# Patient Record
Sex: FEMALE | Race: ASIAN | Employment: FULL TIME | URBAN - METROPOLITAN AREA
[De-identification: names, ages, dates, MRNs, and addresses within clinical notes are randomized per-mention and may not be internally consistent; named-entity substitution may affect disease eponyms.]

---

## 2023-01-11 ENCOUNTER — OFFICE VISIT (OUTPATIENT)
Dept: OBGYN CLINIC | Age: 33
End: 2023-01-11
Payer: COMMERCIAL

## 2023-01-11 VITALS
HEIGHT: 67 IN | DIASTOLIC BLOOD PRESSURE: 70 MMHG | BODY MASS INDEX: 19.3 KG/M2 | WEIGHT: 123 LBS | SYSTOLIC BLOOD PRESSURE: 110 MMHG

## 2023-01-11 DIAGNOSIS — Z34.01 NORMAL FIRST PREGNANCY CONFIRMED, CURRENTLY IN FIRST TRIMESTER: ICD-10-CM

## 2023-01-11 DIAGNOSIS — N92.6 MISSED MENSES: Primary | ICD-10-CM

## 2023-01-11 LAB
ABO + RH BLD: NORMAL
BASOPHILS # BLD: 0.1 K/UL (ref 0–0.2)
BASOPHILS NFR BLD: 1 % (ref 0–2)
BLOOD GROUP ANTIBODIES SERPL: NORMAL
DIFFERENTIAL METHOD BLD: ABNORMAL
EOSINOPHIL # BLD: 0.1 K/UL (ref 0–0.8)
EOSINOPHIL NFR BLD: 1 % (ref 0.5–7.8)
ERYTHROCYTE [DISTWIDTH] IN BLOOD BY AUTOMATED COUNT: 12.8 % (ref 11.9–14.6)
HCT VFR BLD AUTO: 37.6 % (ref 35.8–46.3)
HGB BLD-MCNC: 12.5 G/DL (ref 11.7–15.4)
IMM GRANULOCYTES # BLD AUTO: 0 K/UL (ref 0–0.5)
IMM GRANULOCYTES NFR BLD AUTO: 0 % (ref 0–5)
LYMPHOCYTES # BLD: 2.5 K/UL (ref 0.5–4.6)
LYMPHOCYTES NFR BLD: 24 % (ref 13–44)
MCH RBC QN AUTO: 29.7 PG (ref 26.1–32.9)
MCHC RBC AUTO-ENTMCNC: 33.2 G/DL (ref 31.4–35)
MCV RBC AUTO: 89.3 FL (ref 82–102)
MONOCYTES # BLD: 0.9 K/UL (ref 0.1–1.3)
MONOCYTES NFR BLD: 8 % (ref 4–12)
NEUTS SEG # BLD: 7.1 K/UL (ref 1.7–8.2)
NEUTS SEG NFR BLD: 66 % (ref 43–78)
NRBC # BLD: 0 K/UL (ref 0–0.2)
PLATELET # BLD AUTO: 287 K/UL (ref 150–450)
PMV BLD AUTO: 9.3 FL (ref 9.4–12.3)
RBC # BLD AUTO: 4.21 M/UL (ref 4.05–5.2)
WBC # BLD AUTO: 10.6 K/UL (ref 4.3–11.1)

## 2023-01-11 PROCEDURE — 99203 OFFICE O/P NEW LOW 30 MIN: CPT | Performed by: OBSTETRICS & GYNECOLOGY

## 2023-01-11 PROCEDURE — 76856 US EXAM PELVIC COMPLETE: CPT | Performed by: OBSTETRICS & GYNECOLOGY

## 2023-01-11 NOTE — PROGRESS NOTES
Patient presents today for   Chief Complaint   Patient presents with    New Patient     : Arti Jefferson #[id number]      Amenorrhea     LMP: Patient's last menstrual period was 10/23/2022 (approximate). Contraception: none        No Known Allergies  Current Outpatient Medications   Medication Sig Dispense Refill    pyridoxine (RA VITAMIN B-6) 50 MG tablet Take 1 tablet by mouth in the morning and at bedtime 60 tablet 3    aspirin EC 81 MG EC tablet Take 1 tablet by mouth daily 90 tablet 2    Calcium Citrate-Vitamin D (CELEBRATE CALCIUM CITRATE) 500-12.5 MG-MCG CHEW Take 1 tablet by mouth daily 90 tablet 3    UNABLE TO FIND SeroVital Advanced      Prenatal Vit-Fe Fumarate-FA (PRENATAL PO) Take by mouth       No current facility-administered medications for this visit. History reviewed. No pertinent past medical history. No past surgical history on file.   Social History     Socioeconomic History    Marital status:      Spouse name: Not on file    Number of children: Not on file    Years of education: Not on file    Highest education level: Not on file   Occupational History    Not on file   Tobacco Use    Smoking status: Never    Smokeless tobacco: Never   Vaping Use    Vaping Use: Never used   Substance and Sexual Activity    Alcohol use: Never    Drug use: Never    Sexual activity: Yes     Partners: Male   Other Topics Concern    Not on file   Social History Narrative    Not on file     Social Determinants of Health     Financial Resource Strain: Not on file   Food Insecurity: Not on file   Transportation Needs: Not on file   Physical Activity: Not on file   Stress: Not on file   Social Connections: Not on file   Intimate Partner Violence: Not on file   Housing Stability: Not on file     Family History   Problem Relation Age of Onset    Breast Cancer Neg Hx     Colon Cancer Neg Hx     Ovarian Cancer Neg Hx      /70   Ht 5' 7\" (1.702 m)   Wt 123 lb (55.8 kg)   LMP 10/23/2022 (Approximate)   BMI 19.26 kg/m²      ROS:  Constitutional: Negative. HENT: Negative. Eyes: Negative. Respiratory: Negative. Cardiovascular: Negative. Gastrointestinal: Negative. Endocrine: Negative. Genitourinary: Negative. Musculoskeletal: Negative. Allergic/Immunologic: Negative. Neurological: Negative. Hematological: Negative. Psychiatric/Behavioral: Negative. Physical Exam  Vitals reviewed. Constitutional:       General: She is not in acute distress. Appearance: Normal appearance. She is well-developed. She is not ill-appearing, toxic-appearing or diaphoretic. HENT:      Head: Normocephalic and atraumatic. Eyes:      General: No scleral icterus. Conjunctiva/sclera: Conjunctivae normal.      Pupils: Pupils are equal, round, and reactive to light. Pulmonary:      Effort: Pulmonary effort is normal. No respiratory distress. Abdominal:      General: There is no distension. Musculoskeletal:         General: Normal range of motion. Cervical back: Normal range of motion and neck supple. Skin:     General: Skin is warm and dry. Coloration: Skin is not jaundiced or pale. Findings: No erythema or rash. Neurological:      General: No focal deficit present. Mental Status: She is alert, oriented to person, place, and time and easily aroused. Motor: No abnormal muscle tone. Coordination: Coordination normal.   Psychiatric:         Mood and Affect: Mood normal.         Speech: Speech normal.         Behavior: Behavior normal. Behavior is cooperative. Thought Content: Thought content normal.         Judgment: Judgment normal.     Imaging: See scanned report. IUP visualized with +FHT, CRL inconsistent with dates by LMP. Will use PONCHO by US today. PONCHO 8/11/2023, 9w5d. 1. Missed menses    2.  Normal first pregnancy confirmed, currently in first trimester      Orders Placed This Encounter   Procedures    Culture, Urine    AMB POC US, PELVIC COMPLETE    CBC with Auto Differential    Rubella antibody, IgG    HIV 1/2 Ag/Ab, 4TH Generation,W Rflx Confirm    Hepatitis C Antibody    Hepatitis B Surface Antigen    RPR    ABO/Rh    Antibody Screen     Orders Placed This Encounter   Medications    pyridoxine (RA VITAMIN B-6) 50 MG tablet     Sig: Take 1 tablet by mouth in the morning and at bedtime     Dispense:  60 tablet     Refill:  3    aspirin EC 81 MG EC tablet     Sig: Take 1 tablet by mouth daily     Dispense:  90 tablet     Refill:  2    Calcium Citrate-Vitamin D (CELEBRATE CALCIUM CITRATE) 500-12.5 MG-MCG CHEW     Sig: Take 1 tablet by mouth daily     Dispense:  90 tablet     Refill:  3     IUP confirmed with ultrasound office today after missed period. ROS reveals common discomforts of pregnancy. Reviewed anticipated PONCHO with patient based on LMP which is confirmed on imaging today. Questions encouraged and answered. Reviewed plan of care for supervision of pregnancy. Patient will return for a new OB education appt and have prenatal labs reviewed at that time. Encouraged taking a daily prenatal vitamin with folic acid and DHA which patient is already doing. Patient instructed to notify us or seek medical attention for any abdominal pain, cramping and vaginal bleeding, fever, or vomiting other than mild pregnancy sickness. IUP confirmed with ultrasound office today after missed period. ROS reveals common discomforts of pregnancy. Reviewed anticipated PONCHO with patient based on LMP which is confirmed on imaging today. Questions encouraged and answered. Reviewed plan of care for supervision of pregnancy. Patient will return for a new OB education appt and have prenatal labs reviewed at that time. Encouraged taking a daily prenatal vitamin with folic acid and DHA which patient is already doing.  Patient instructed to notify us or seek medical attention for any abdominal pain, cramping and vaginal bleeding, fever, or vomiting other than mild pregnancy sickness. Reviewed her supplements and advised pt to take the PNV and DHA, no extra fish oil supp needed due to higher than recommended doses of some of the vitamins if taking both. Greater than 35 minutes spent on reviewing her imaging, ordering labs and in face to face counseling, education, and examining the patient regarding missed period, pregnancy dx and POC for follow up visits. Return in about 2 weeks (around 1/25/2023), or if symptoms worsen or fail to improve, for NewOB education w/ OB coordinator.

## 2023-01-12 LAB
HBV SURFACE AG SER QL: NONREACTIVE
HCV AB SER QL: NONREACTIVE
HIV 1+2 AB+HIV1 P24 AG SERPL QL IA: NONREACTIVE
HIV 1/2 RESULT COMMENT: NORMAL
RPR SER QL: NONREACTIVE
RUBV IGG SERPL IA-ACNC: 105.5 IU/ML (ref 0–50)

## 2023-01-14 LAB
BACTERIA SPEC CULT: NORMAL
SERVICE CMNT-IMP: NORMAL

## 2023-01-16 ENCOUNTER — TELEPHONE (OUTPATIENT)
Dept: OBGYN CLINIC | Age: 33
End: 2023-01-16

## 2023-01-16 RX ORDER — ASPIRIN 81 MG/1
81 TABLET ORAL DAILY
Qty: 90 TABLET | Refills: 2 | Status: SHIPPED | OUTPATIENT
Start: 2023-01-16

## 2023-01-16 RX ORDER — PYRIDOXINE HCL (VITAMIN B6) 50 MG
50 TABLET ORAL 2 TIMES DAILY
Qty: 60 TABLET | Refills: 3 | Status: SHIPPED | OUTPATIENT
Start: 2023-01-16

## 2023-01-16 NOTE — TELEPHONE ENCOUNTER
Pts  called regarding prescriptions discussed at last visit. Pt's  (on VIRGEN) said the pharmacy has not yet received the rx. Informed him that 3 rx's were sent to [id number], [address] [place] [address] today, he voiced understanding.

## 2023-01-23 ENCOUNTER — NURSE ONLY (OUTPATIENT)
Dept: OBGYN CLINIC | Age: 33
End: 2023-01-23

## 2023-01-23 VITALS — HEIGHT: 67 IN | WEIGHT: 124 LBS | BODY MASS INDEX: 19.46 KG/M2

## 2023-01-23 DIAGNOSIS — Z34.01 NORMAL FIRST PREGNANCY CONFIRMED, CURRENTLY IN FIRST TRIMESTER: Primary | ICD-10-CM

## 2023-01-23 RX ORDER — ONDANSETRON 4 MG/1
4 TABLET, ORALLY DISINTEGRATING ORAL 3 TIMES DAILY PRN
Qty: 30 TABLET | Refills: 1 | Status: SHIPPED | OUTPATIENT
Start: 2023-01-23

## 2023-01-23 RX ORDER — LANOLIN ALCOHOL/MO/W.PET/CERES
50 CREAM (GRAM) TOPICAL EVERY 6 HOURS
Qty: 120 TABLET | Refills: 3 | Status: SHIPPED | OUTPATIENT
Start: 2023-01-23 | End: 2023-02-22

## 2023-01-23 NOTE — PROGRESS NOTES
NOB consult with pt and FOB. Labs today: NIPT and Carrier Screen. Offered pt the option of CF, SMA, and Fragile X carrier testing. Pt desires testing. Offered pt the option of genetic screening (1st screen vs Tetra vs NIPT). Pt desires NIPT. Instructed pt on exercise/nutrition in pregnancy. Reviewed [facility] preg book. Advised pt on using [facility] for hospital needs and [facility] L&D for pregnancy related emergencies. Pt states understanding. NOB forms signed, scanned, and given to pt. [facility]  Nir Kim #[id number]    Medical Hx: none    Surgical Hx: none    Last Pap: never per pt. Pt notified pap smear and std screening will be done at [facility]. Pt OB c/o: N/V daily, vomiting is getting worse. Rx for Unisom and Vit B6 sent to pharmacy on file. Instructions reviewed. Pt voiced understanding. Rx for Zofran sent, pt instructed to only use PRN if Unisom and B6 do not help improve N/V. Fam hx any chromosomal or inheritable disorders: none     COVID Vaccine: x2 per pt    Flu Vaccine: declines     OB hx: none, first pregnancy    [facility]: NOBEX on 2/8/23 with . NIPT and Carrier Screen collected and sent to [facility] via [employer].

## 2023-02-08 ENCOUNTER — INITIAL PRENATAL (OUTPATIENT)
Dept: OBGYN CLINIC | Age: 33
End: 2023-02-08

## 2023-02-08 VITALS — SYSTOLIC BLOOD PRESSURE: 102 MMHG | DIASTOLIC BLOOD PRESSURE: 60 MMHG | BODY MASS INDEX: 19.89 KG/M2 | WEIGHT: 127 LBS

## 2023-02-08 DIAGNOSIS — Z11.3 SCREEN FOR STD (SEXUALLY TRANSMITTED DISEASE): ICD-10-CM

## 2023-02-08 DIAGNOSIS — Z11.51 SCREENING FOR HUMAN PAPILLOMAVIRUS (HPV): ICD-10-CM

## 2023-02-08 DIAGNOSIS — Z34.02 NORMAL FIRST PREGNANCY CONFIRMED, CURRENTLY IN SECOND TRIMESTER: Primary | ICD-10-CM

## 2023-02-08 DIAGNOSIS — Z12.4 SCREENING FOR CERVICAL CANCER: ICD-10-CM

## 2023-02-08 DIAGNOSIS — Z34.02 SUPERVISION OF NORMAL FIRST PREGNANCY IN SECOND TRIMESTER: ICD-10-CM

## 2023-02-08 PROCEDURE — 99902 PR PRENATAL VISIT: CPT | Performed by: OBSTETRICS & GYNECOLOGY

## 2023-02-09 NOTE — PROGRESS NOTES
Patient presents today for   Chief Complaint   Patient presents with    Initial Prenatal Visit       LMP: Patient's last menstrual period was 10/23/2022 (approximate). Contraception: none        No Known Allergies  Current Outpatient Medications   Medication Sig Dispense Refill    vitamin B-6 (PYRIDOXINE) 50 MG tablet Take 1 tablet by mouth in the morning and 1 tablet at noon and 1 tablet in the evening and 1 tablet before bedtime. 120 tablet 3    aspirin EC 81 MG EC tablet Take 1 tablet by mouth daily 90 tablet 2    UNABLE TO FIND SeroVital Advanced      Prenatal Vit-Fe Fumarate-FA (PRENATAL PO) Take by mouth      doxyLAMINE succinate ([facility] SLEEP-A[facility], DOXYLAMINE,) 25 MG tablet Take 1 tablet by mouth nightly (Patient not taking: Reported on 2/8/2023) 30 tablet 3    ondansetron (ZOFRAN-ODT) 4 MG disintegrating tablet Take 1 tablet by mouth 3 times daily as needed for Nausea or Vomiting (Patient not taking: Reported on 2/8/2023) 30 tablet 1     No current facility-administered medications for this visit. No past medical history on file. No past surgical history on file.   Social History     Socioeconomic History    Marital status:      Spouse name: Not on file    Number of children: Not on file    Years of education: Not on file    Highest education level: Not on file   Occupational History    Not on file   Tobacco Use    Smoking status: Never    Smokeless tobacco: Never   Vaping Use    Vaping Use: Never used   Substance and Sexual Activity    Alcohol use: Never    Drug use: Never    Sexual activity: Yes     Partners: Male   Other Topics Concern    Not on file   Social History Narrative    Not on file     Social Determinants of Health     Financial Resource Strain: Not on file   Food Insecurity: Not on file   Transportation Needs: Not on file   Physical Activity: Not on file   Stress: Not on file   Social Connections: Not on file   Intimate Partner Violence: Not on file   Housing Stability: Not on file Family History   Problem Relation Age of Onset    Breast Cancer Neg Hx     Colon Cancer Neg Hx     Ovarian Cancer Neg Hx      /60   Wt 127 lb (57.6 kg)   LMP 10/23/2022 (Approximate)   BMI 19.89 kg/m²      ROS:  Constitutional: Negative. HENT: Negative. Eyes: Negative. Respiratory: Negative. Cardiovascular: Negative. Gastrointestinal: Negative. Endocrine: Negative. Genitourinary: Negative. Musculoskeletal: Negative. Allergic/Immunologic: Negative. Neurological: Negative. Hematological: Negative. Psychiatric/Behavioral: Negative. Physical Exam  Vitals signs reviewed. Constitutional:       General: She is not in acute distress. Appearance: Normal appearance. She is well-developed. She is not ill-appearing, toxic-appearing or diaphoretic. HENT:      Head: Normocephalic and atraumatic. Eyes:      General: No scleral icterus. Conjunctiva/sclera: Conjunctivae normal.      Pupils: Pupils are equal, round, and reactive to light. Neck:      Musculoskeletal: Normal range of motion and neck supple. Thyroid: No thyromegaly. Vascular: No JVD. Trachea: No tracheal deviation. Cardiovascular:      Rate and Rhythm: Normal rate and regular rhythm. Heart sounds: Normal heart sounds. No murmur. No friction rub. No gallop. Pulmonary:      Effort: Pulmonary effort is normal. No respiratory distress. Breath sounds: Normal breath sounds. No stridor. No wheezing, rhonchi or rales. Chest:      Chest wall: No tenderness. Breasts:         Right: No inverted nipple, mass, nipple discharge, skin change or tenderness. Left: No inverted nipple, mass, nipple discharge, skin change or tenderness. Abdominal:      General: Bowel sounds are normal. There is no distension. Palpations: Abdomen is soft. There is no mass. Tenderness: There is no abdominal tenderness. There is no guarding or rebound.    Genitourinary: Labia:         Right: No rash, tenderness, lesion or injury.         Left: No rash, tenderness, lesion or injury.       Vagina: Normal. No signs of injury and foreign body. No vaginal discharge, erythema, tenderness or bleeding.      Cervix: No cervical motion tenderness, discharge or friability.      Uterus: Enlarged and not tender.       Adnexa:         Right: No mass, tenderness or fullness.          Left: No mass, tenderness or fullness.        Comments: External genitalia are normal in appearance.    Examination of urethral meatus reveals location normal and size normal.   Examination of urethra shows no abnormalities.   Examination of vaginal vault reveals no abnormalities.   Cervix is long and closed without visible pathology.   Pap smear collected.  Uterine portion of bimanual exam reveals contour normal, shape regular, descensus absent, no nodularity, no tenderness and size 13+ weeks GA.    Adnexa and parametria exam reveals no masses, nontender.    Visual examination of anus and perineum shows no abnormalities.  Musculoskeletal: Normal range of motion.         General: No tenderness.   Lymphadenopathy:      Cervical: No cervical adenopathy.      Upper Body:      Right upper body: No supraclavicular adenopathy.      Left upper body: No supraclavicular adenopathy.      Lower Body: No right inguinal adenopathy. No left inguinal adenopathy.   Skin:     General: Skin is warm and dry.      Coloration: Skin is not pale.      Findings: No erythema or rash.   Neurological:      Mental Status: She is alert and oriented to person, place, and time.      Cranial Nerves: No cranial nerve deficit.      Motor: No abnormal muscle tone.      Coordination: Coordination normal.   Psychiatric:         Behavior: Behavior normal.         Thought Content: Thought content normal.         Judgment: Judgment normal.      +++FHTs     1. Normal first pregnancy confirmed, currently in second trimester    2. Screening for cervical  cancer    3. Screening for human papillomavirus (HPV)    4. Screen for STD (sexually transmitted disease)    5. Supervision of normal first pregnancy in second trimester      Orders Placed This Encounter   Procedures    PAP IG, CT-NG-TV, Aptima HPV and rfx 47/61,54([id number])     No orders of the defined types were placed in this encounter. 34yo G1 at 13w5d for initial prenatal visit:  PNL reviewed. NIPT normal female. Ucx neg. B pos. Antibody neg. CBC WNL. RI. HIV NR. HCV NR. HBsAg NR. RPR NR. Pap smear collected with cultures. Routine OB counseling reviewed. RTC 6 wks for Anatomy [address]. Return if symptoms worsen or fail to improve, for Anatomy US then LG

## 2023-02-14 LAB
C TRACH RRNA CVX QL NAA+PROBE: NEGATIVE
CYTOLOGIST CVX/VAG CYTO: NORMAL
CYTOLOGY CVX/VAG DOC THIN PREP: NORMAL
HPV APTIMA: NEGATIVE
HPV GENOTYPE REFLEX: NORMAL
Lab: NORMAL
N GONORRHOEA RRNA CVX QL NAA+PROBE: NEGATIVE
PATH REPORT.FINAL DX SPEC: NORMAL
STAT OF ADQ CVX/VAG CYTO-IMP: NORMAL
T VAGINALIS RRNA SPEC QL NAA+PROBE: NEGATIVE

## 2023-02-20 ENCOUNTER — TELEPHONE (OUTPATIENT)
Dept: OBGYN CLINIC | Age: 33
End: 2023-02-20

## 2023-02-20 PROBLEM — Z34.02 SUPERVISION OF NORMAL FIRST PREGNANCY IN SECOND TRIMESTER: Status: ACTIVE | Noted: 2023-02-20

## 2023-02-20 NOTE — TELEPHONE ENCOUNTER
Phone call the patient's , per VIRGEN. Carrier screening and NIPT results given.  states they saw it on the portal already.

## 2023-04-26 ENCOUNTER — ROUTINE PRENATAL (OUTPATIENT)
Dept: OBGYN CLINIC | Age: 33
End: 2023-04-26
Payer: COMMERCIAL

## 2023-04-26 VITALS — BODY MASS INDEX: 21.93 KG/M2 | WEIGHT: 140 LBS | DIASTOLIC BLOOD PRESSURE: 70 MMHG | SYSTOLIC BLOOD PRESSURE: 120 MMHG

## 2023-04-26 DIAGNOSIS — Z36.2 ENCOUNTER FOR FOLLOW-UP ULTRASOUND OF FETAL ANATOMY: Primary | ICD-10-CM

## 2023-04-26 DIAGNOSIS — Z34.02 SUPERVISION OF NORMAL FIRST PREGNANCY IN SECOND TRIMESTER: ICD-10-CM

## 2023-04-26 PROCEDURE — 76816 OB US FOLLOW-UP PER FETUS: CPT | Performed by: OBSTETRICS & GYNECOLOGY

## 2023-04-26 PROCEDURE — 99902 PR PRENATAL VISIT: CPT | Performed by: OBSTETRICS & GYNECOLOGY

## 2023-04-26 SDOH — ECONOMIC STABILITY: INCOME INSECURITY: HOW HARD IS IT FOR YOU TO PAY FOR THE VERY BASICS LIKE FOOD, HOUSING, MEDICAL CARE, AND HEATING?: NOT HARD AT ALL

## 2023-04-26 SDOH — ECONOMIC STABILITY: HOUSING INSECURITY
IN THE LAST 12 MONTHS, WAS THERE A TIME WHEN YOU DID NOT HAVE A STEADY PLACE TO SLEEP OR SLEPT IN A SHELTER (INCLUDING NOW)?: NO

## 2023-04-26 SDOH — ECONOMIC STABILITY: TRANSPORTATION INSECURITY
IN THE PAST 12 MONTHS, HAS LACK OF TRANSPORTATION KEPT YOU FROM MEETINGS, WORK, OR FROM GETTING THINGS NEEDED FOR DAILY LIVING?: NO

## 2023-04-26 SDOH — ECONOMIC STABILITY: FOOD INSECURITY: WITHIN THE PAST 12 MONTHS, THE FOOD YOU BOUGHT JUST DIDN'T LAST AND YOU DIDN'T HAVE MONEY TO GET MORE.: NEVER TRUE

## 2023-04-26 SDOH — ECONOMIC STABILITY: FOOD INSECURITY: WITHIN THE PAST 12 MONTHS, YOU WORRIED THAT YOUR FOOD WOULD RUN OUT BEFORE YOU GOT MONEY TO BUY MORE.: NEVER TRUE

## 2023-05-03 NOTE — PROGRESS NOTES
4/26/23:  34yo G1 at 24w5d for KATRIN s/p repeat anatomy US:    +FM, no VB, no LOF. Vitals:    04/26/23 1424   BP: 120/70     Weight Metrics 4/26/2023 3/29/2023 2/8/2023 1/23/2023 1/11/2023   Weight 140 lb 137 lb 127 lb 124 lb 123 lb   BMI (Calculated) 0 kg/m2 0 kg/m2 0 kg/m2 19.5 kg/m2 19.3 kg/m2     US today:  AC 69%ile    Anatomy WNL   Cephalic  Female fetus  CL 3.5         1. Encounter for follow-up ultrasound of fetal anatomy    2. Supervision of normal first pregnancy in second trimester      Orders Placed This Encounter   Procedures    AMB POC US OB RE-EVAL/FOLLOW UP     No orders of the defined types were placed in this encounter. 34yo G1 at 24w5d for KATRIN s/p repeat Anatomy US:  PNL reviewed. NIPT normal female. Ucx neg. B pos. Antibody neg. CBC WNL. RI. HIV NR. HCV NR. HBsAg NR. RPR NR. Pap smear collected with cultures. --> Pap normal, cx neg  Routine OB counseling reviewed. Imaging reviewed & discussed. Anatomy complete & WNL. RTC 4wks for 1hr GTT & KATRIN.

## 2023-05-24 ENCOUNTER — ROUTINE PRENATAL (OUTPATIENT)
Dept: OBGYN CLINIC | Age: 33
End: 2023-05-24

## 2023-05-24 VITALS — DIASTOLIC BLOOD PRESSURE: 60 MMHG | BODY MASS INDEX: 22.71 KG/M2 | SYSTOLIC BLOOD PRESSURE: 120 MMHG | WEIGHT: 145 LBS

## 2023-05-24 DIAGNOSIS — Z34.03 NORMAL FIRST PREGNANCY CONFIRMED, CURRENTLY IN THIRD TRIMESTER: Primary | ICD-10-CM

## 2023-05-24 DIAGNOSIS — Z13.1 SCREENING FOR DIABETES MELLITUS: ICD-10-CM

## 2023-05-24 DIAGNOSIS — Z13.0 SCREENING, ANEMIA, DEFICIENCY, IRON: ICD-10-CM

## 2023-05-24 LAB
BASOPHILS # BLD: 0 K/UL (ref 0–0.2)
BASOPHILS NFR BLD: 0 % (ref 0–2)
DIFFERENTIAL METHOD BLD: ABNORMAL
EOSINOPHIL # BLD: 0.1 K/UL (ref 0–0.8)
EOSINOPHIL NFR BLD: 1 % (ref 0.5–7.8)
ERYTHROCYTE [DISTWIDTH] IN BLOOD BY AUTOMATED COUNT: 13 % (ref 11.9–14.6)
GLUCOSE 1 HOUR: 156 MG/DL
HCT VFR BLD AUTO: 37.4 % (ref 35.8–46.3)
HGB BLD-MCNC: 12.6 G/DL (ref 11.7–15.4)
IMM GRANULOCYTES # BLD AUTO: 0.1 K/UL (ref 0–0.5)
IMM GRANULOCYTES NFR BLD AUTO: 1 % (ref 0–5)
LYMPHOCYTES # BLD: 1.8 K/UL (ref 0.5–4.6)
LYMPHOCYTES NFR BLD: 17 % (ref 13–44)
MCH RBC QN AUTO: 30.7 PG (ref 26.1–32.9)
MCHC RBC AUTO-ENTMCNC: 33.7 G/DL (ref 31.4–35)
MCV RBC AUTO: 91 FL (ref 82–102)
MONOCYTES # BLD: 0.6 K/UL (ref 0.1–1.3)
MONOCYTES NFR BLD: 5 % (ref 4–12)
NEUTS SEG # BLD: 8.4 K/UL (ref 1.7–8.2)
NEUTS SEG NFR BLD: 76 % (ref 43–78)
NRBC # BLD: 0 K/UL (ref 0–0.2)
PLATELET # BLD AUTO: 263 K/UL (ref 150–450)
PMV BLD AUTO: 8.8 FL (ref 9.4–12.3)
RBC # BLD AUTO: 4.11 M/UL (ref 4.05–5.2)
WBC # BLD AUTO: 10.9 K/UL (ref 4.3–11.1)

## 2023-05-24 PROCEDURE — 99902 PR PRENATAL VISIT: CPT | Performed by: OBSTETRICS & GYNECOLOGY

## 2023-06-06 NOTE — PROGRESS NOTES
This is a 29 y.o.   at 30w5d for routine OB visit. [facility]  present via ipad during entire visit today. Her Estimated Due Date is 2023, by Ultrasound    Denies leaking of fluid, vaginal bleeding, or regular contractions. Reports fetal movement. States having occasional \"pains to her abdomen\" however, they resolve and denies feeling any regular contractions. Denies HA, blurred vision or RUQ pain. Taking Prenatal Vitamins: Yes     FH: 30cm  FHTs: 140s      Current Outpatient Medications on File Prior to Visit   Medication Sig Dispense Refill    UNABLE TO FIND SeroVital Advanced      Prenatal Vit-Fe Fumarate-FA (PRENATAL PO) Take by mouth      vitamin B-6 (PYRIDOXINE) 50 MG tablet Take 1 tablet by mouth in the morning and 1 tablet at noon and 1 tablet in the evening and 1 tablet before bedtime. 120 tablet 3     No current facility-administered medications on file prior to visit. No Known Allergies    OB History    Para Term  AB Living   1 0 0 0 0 0   SAB IAB Ectopic Molar Multiple Live Births   0 0 0 0 0 0       # 1 - Date: None, Sex: None, Weight: None, GA: None, Delivery: None, Apgar1: None, Apgar5: None, Living: None, Birth Comments: None        History reviewed. No pertinent past medical history. History reviewed. No pertinent surgical history.     Family History   Problem Relation Age of Onset    Breast Cancer Neg Hx     Colon Cancer Neg Hx     Ovarian Cancer Neg Hx        Social History     Socioeconomic History    Marital status:      Spouse name: Not on file    Number of children: Not on file    Years of education: Not on file    Highest education level: Not on file   Occupational History    Not on file   Tobacco Use    Smoking status: Never    Smokeless tobacco: Never   Vaping Use    Vaping Use: Never used   Substance and Sexual Activity    Alcohol use: Never    Drug use: Never    Sexual activity: Yes     Partners: Male   Other Topics Concern

## 2023-06-07 ENCOUNTER — ROUTINE PRENATAL (OUTPATIENT)
Dept: OBGYN CLINIC | Age: 33
End: 2023-06-07

## 2023-06-07 VITALS — SYSTOLIC BLOOD PRESSURE: 118 MMHG | DIASTOLIC BLOOD PRESSURE: 74 MMHG | WEIGHT: 146 LBS | BODY MASS INDEX: 22.87 KG/M2

## 2023-06-07 DIAGNOSIS — Z34.02 SUPERVISION OF NORMAL FIRST PREGNANCY IN SECOND TRIMESTER: Primary | ICD-10-CM

## 2023-06-07 DIAGNOSIS — R73.09 ABNORMAL GLUCOSE TOLERANCE TEST (GTT): ICD-10-CM

## 2023-06-07 DIAGNOSIS — Z3A.30 30 WEEKS GESTATION OF PREGNANCY: ICD-10-CM

## 2023-06-07 PROCEDURE — 99902 PR PRENATAL VISIT: CPT | Performed by: NURSE PRACTITIONER

## 2023-06-07 NOTE — PATIENT INSTRUCTIONS
PTL/labor precautions, [address], and pregnancy warning signs reviewed. Pt advised to call the office at [phone] or go straight to Labor and Delivery at [facility] with any of the following concerns vaginal bleeding, leaking of fluid, aba regularly Q 5-7 minutes for over an hour or not feeling the baby move.      Kick counts and pre-term labor precautions reviewed

## 2023-06-20 NOTE — PROGRESS NOTES
This is a 29 y.o.   at 32w5d for routine OB visit. [facility]  present via ipad during entire visit today. Her Estimated Due Date is 2023, by Ultrasound    Denies leaking of fluid, vaginal bleeding, or regular contractions. Reports fetal movement. Denies HA, blurred vision or RUQ pain. Taking Prenatal Vitamins: Yes     FH: 31cm  FHTs: 150s      Current Outpatient Medications on File Prior to Visit   Medication Sig Dispense Refill    UNABLE TO FIND SeroVital Advanced      Prenatal Vit-Fe Fumarate-FA (PRENATAL PO) Take by mouth       No current facility-administered medications on file prior to visit. No Known Allergies    OB History    Para Term  AB Living   1 0 0 0 0 0   SAB IAB Ectopic Molar Multiple Live Births   0 0 0 0 0 0       # 1 - Date: None, Sex: None, Weight: None, GA: None, Delivery: None, Apgar1: None, Apgar5: None, Living: None, Birth Comments: None        History reviewed. No pertinent past medical history. History reviewed. No pertinent surgical history.     Family History   Problem Relation Age of Onset    Breast Cancer Neg Hx     Colon Cancer Neg Hx     Ovarian Cancer Neg Hx        Social History     Socioeconomic History    Marital status:      Spouse name: Not on file    Number of children: Not on file    Years of education: Not on file    Highest education level: Not on file   Occupational History    Not on file   Tobacco Use    Smoking status: Never    Smokeless tobacco: Never   Vaping Use    Vaping Use: Never used   Substance and Sexual Activity    Alcohol use: Never    Drug use: Never    Sexual activity: Yes     Partners: Male   Other Topics Concern    Not on file   Social History Narrative    Not on file     Social Determinants of Health     Financial Resource Strain: Not on file   Food Insecurity: Not on file   Transportation Needs: Not on file   Physical Activity: Not on file   Stress: Not on file   Social Connections: Not on file

## 2023-06-21 ENCOUNTER — ROUTINE PRENATAL (OUTPATIENT)
Dept: OBGYN CLINIC | Age: 33
End: 2023-06-21

## 2023-06-21 VITALS — WEIGHT: 148 LBS | BODY MASS INDEX: 23.18 KG/M2 | DIASTOLIC BLOOD PRESSURE: 70 MMHG | SYSTOLIC BLOOD PRESSURE: 102 MMHG

## 2023-06-21 DIAGNOSIS — Z34.03 ENCOUNTER FOR PRENATAL CARE OF PRIMIGRAVIDA IN THIRD TRIMESTER, ANTEPARTUM: Primary | ICD-10-CM

## 2023-06-21 DIAGNOSIS — Z3A.32 32 WEEKS GESTATION OF PREGNANCY: ICD-10-CM

## 2023-06-21 NOTE — PATIENT INSTRUCTIONS
PTL/labor precautions, [address], and pregnancy warning signs reviewed. Pt advised to call the office at [phone] or go straight to Labor and Delivery at [facility] with any of the following concerns vaginal bleeding, leaking of fluid, aba regularly Q 5-7 minutes for over an hour or not feeling the baby move.      Kick counts and pre-term labor precautions reviewed

## 2023-07-10 ENCOUNTER — ROUTINE PRENATAL (OUTPATIENT)
Dept: OBGYN CLINIC | Age: 33
End: 2023-07-10

## 2023-07-10 VITALS — BODY MASS INDEX: 23.81 KG/M2 | SYSTOLIC BLOOD PRESSURE: 120 MMHG | WEIGHT: 152 LBS | DIASTOLIC BLOOD PRESSURE: 74 MMHG

## 2023-07-10 DIAGNOSIS — Z34.02 SUPERVISION OF NORMAL FIRST PREGNANCY IN SECOND TRIMESTER: Primary | ICD-10-CM

## 2023-07-10 PROCEDURE — 99902 PR PRENATAL VISIT: CPT | Performed by: OBSTETRICS & GYNECOLOGY

## 2023-07-10 RX ORDER — SALICYLIC ACID 40 %
ADHESIVE PATCH, MEDICATED TOPICAL
COMMUNITY
Start: 2023-07-09

## 2023-07-17 ENCOUNTER — ROUTINE PRENATAL (OUTPATIENT)
Dept: OBGYN CLINIC | Age: 33
End: 2023-07-17

## 2023-07-17 VITALS — WEIGHT: 151 LBS | SYSTOLIC BLOOD PRESSURE: 118 MMHG | DIASTOLIC BLOOD PRESSURE: 72 MMHG | BODY MASS INDEX: 23.65 KG/M2

## 2023-07-17 DIAGNOSIS — Z34.03 SUPERVISION OF NORMAL FIRST PREGNANCY IN THIRD TRIMESTER: Primary | ICD-10-CM

## 2023-07-17 DIAGNOSIS — Z36.85 ANTENATAL SCREENING FOR STREPTOCOCCUS B: ICD-10-CM

## 2023-07-17 DIAGNOSIS — Z3A.36 36 WEEKS GESTATION OF PREGNANCY: ICD-10-CM

## 2023-07-17 DIAGNOSIS — N76.0 ACUTE VAGINITIS: ICD-10-CM

## 2023-07-17 PROCEDURE — 99902 PR PRENATAL VISIT: CPT | Performed by: NURSE PRACTITIONER

## 2023-07-17 NOTE — PATIENT INSTRUCTIONS
PTL/labor precautions, [place], and pregnancy warning signs reviewed. Pt advised to call the office at [phone] or go straight to Labor and Delivery at [facility] with any of the following concerns vaginal bleeding, leaking of fluid, aba regularly Q 5-7 minutes for over an hour or not feeling the baby move.      Kick counts and  labor precautions reviewed

## 2023-07-21 LAB
BACTERIA SPEC CULT: NORMAL
SERVICE CMNT-IMP: NORMAL

## 2023-07-27 ENCOUNTER — ROUTINE PRENATAL (OUTPATIENT)
Dept: OBGYN CLINIC | Age: 33
End: 2023-07-27

## 2023-07-27 VITALS — BODY MASS INDEX: 24.12 KG/M2 | DIASTOLIC BLOOD PRESSURE: 72 MMHG | SYSTOLIC BLOOD PRESSURE: 122 MMHG | WEIGHT: 154 LBS

## 2023-07-27 DIAGNOSIS — Z34.03 SUPERVISION OF NORMAL FIRST PREGNANCY IN THIRD TRIMESTER: Primary | ICD-10-CM

## 2023-07-27 DIAGNOSIS — R73.09 ABNORMAL GTT (GLUCOSE TOLERANCE TEST): ICD-10-CM

## 2023-07-27 PROCEDURE — 99902 PR PRENATAL VISIT: CPT | Performed by: OBSTETRICS & GYNECOLOGY

## 2023-07-27 NOTE — PROGRESS NOTES
Chief Complaint   Patient presents with    Routine Prenatal Visit      used during visit  Markus Ma #[id number]     59IG G1 at 37w6d for KATRIN:    +FM, no VB, no LOF. Vitals:    07/27/23 1212   BP: 122/72       Weight Metrics 7/27/2023 7/17/2023 7/10/2023 6/21/2023 6/7/2023 5/24/2023 4/26/2023   Weight 154 lb 151 lb 152 lb 148 lb 146 lb 145 lb 140 lb   BMI (Calculated) 0 kg/m2 0 kg/m2 0 kg/m2 0 kg/m2 0 kg/m2 0 kg/m2 0 kg/m2     +++FHTs (160s)  SVE to be done at next appt after growth US. 1. Supervision of normal first pregnancy in third trimester    2. Abnormal GTT (glucose tolerance test)          No orders of the defined types were placed in this encounter. No orders of the defined types were placed in this encounter. PNL reviewed. NIPT normal female. Ucx neg. B pos. Antibody neg. CBC WNL. RI. HIV NR. HCV NR. HBsAg NR. RPR NR. Pap smear collected with cultures. --> Pap normal, cx neg  Routine OB counseling reviewed. Anatomy complete & WNL at 24wk appt. Failed 1hr, passed 3hr GTT. --> Pt requesting growth US and given that she did have to take the 3hr GTT, will check next week in order for pt to be more informed when choosing spontaneous labor vs IOL vs section. Reassured pt that her baby is cephalic which is ok to deliver vaginally - pt states she prefers vaginal delivery. Will have SVE next appt as well. Kick counts discussed. GBS negative.    RTC 1wks for KATRIN w/ Growth US

## 2023-08-04 ENCOUNTER — ROUTINE PRENATAL (OUTPATIENT)
Dept: OBGYN CLINIC | Age: 33
End: 2023-08-04
Payer: COMMERCIAL

## 2023-08-04 VITALS — SYSTOLIC BLOOD PRESSURE: 120 MMHG | DIASTOLIC BLOOD PRESSURE: 78 MMHG | WEIGHT: 153 LBS | BODY MASS INDEX: 23.96 KG/M2

## 2023-08-04 DIAGNOSIS — O36.63X0 EXCESSIVE FETAL GROWTH AFFECTING MANAGEMENT OF PREGNANCY IN THIRD TRIMESTER, SINGLE OR UNSPECIFIED FETUS: Primary | ICD-10-CM

## 2023-08-04 PROCEDURE — 99902 PR PRENATAL VISIT: CPT | Performed by: OBSTETRICS & GYNECOLOGY

## 2023-08-04 PROCEDURE — 76816 OB US FOLLOW-UP PER FETUS: CPT | Performed by: OBSTETRICS & GYNECOLOGY

## 2023-08-04 NOTE — PROGRESS NOTES
Chief Complaint   Patient presents with    Routine Prenatal Visit    Ultrasound     Growth US     31yo G1 at 39w0d for KATRIN:    +FM, no VB, no LOF. Vitals:    08/04/23 0924   BP: 120/78       Weight Metrics 8/4/2023 7/27/2023 7/17/2023 7/10/2023 6/21/2023 6/7/2023 5/24/2023   Weight 153 lb 154 lb 151 lb 152 lb 148 lb 146 lb 145 lb   BMI (Calculated) 0 kg/m2 0 kg/m2 0 kg/m2 0 kg/m2 0 kg/m2 0 kg/m2 0 kg/m2     Growth US:  EFW 7lbs 6oz, 48%ile  AC 40%ile  VALENTIN 10  BPP 8/8  Anterior placenta  Cephalic     SVE 0/79/-4    1. Excessive fetal growth affecting management of pregnancy in third trimester, single or unspecified fetus          Orders Placed This Encounter   Procedures    AMB POC US OB RE-EVAL/FOLLOW UP     No orders of the defined types were placed in this encounter. PNL reviewed. NIPT normal female. Ucx neg. B pos. Antibody neg. CBC WNL. RI. HIV NR. HCV NR. HBsAg NR. RPR NR. Pap smear collected with cultures. --> Pap normal, cx neg  Routine OB counseling reviewed. Anatomy complete & WNL at 24wk appt. Failed 1hr, passed 3hr GTT. --> Pt requesting growth US and given that she did have to take the 3hr GTT, will check next week in order for pt to be more informed when choosing spontaneous labor vs IOL vs section. Reassured pt that her baby is cephalic which is ok to deliver vaginally - pt states she prefers vaginal delivery. Kick counts discussed. GBS negative.    RTC 1wks for KATRIN

## 2023-08-05 ENCOUNTER — ANESTHESIA (OUTPATIENT)
Dept: LABOR AND DELIVERY | Age: 33
End: 2023-08-05
Payer: COMMERCIAL

## 2023-08-05 ENCOUNTER — HOSPITAL ENCOUNTER (INPATIENT)
Age: 33
LOS: 2 days | Discharge: HOME | End: 2023-08-07
Attending: OBSTETRICS & GYNECOLOGY | Admitting: OBSTETRICS & GYNECOLOGY
Payer: COMMERCIAL

## 2023-08-05 ENCOUNTER — ANESTHESIA EVENT (OUTPATIENT)
Dept: LABOR AND DELIVERY | Age: 33
End: 2023-08-05
Payer: COMMERCIAL

## 2023-08-05 PROBLEM — Z37.9 NORMAL LABOR: Status: ACTIVE | Noted: 2023-08-05

## 2023-08-05 LAB
ABO + RH BLD: NORMAL
BLOOD GROUP ANTIBODIES SERPL: NORMAL
ERYTHROCYTE [DISTWIDTH] IN BLOOD BY AUTOMATED COUNT: 13.5 % (ref 11.9–14.6)
HCT VFR BLD AUTO: 42.3 % (ref 35.8–46.3)
HGB BLD-MCNC: 14.3 G/DL (ref 11.7–15.4)
MCH RBC QN AUTO: 29.9 PG (ref 26.1–32.9)
MCHC RBC AUTO-ENTMCNC: 33.8 G/DL (ref 31.4–35)
MCV RBC AUTO: 88.3 FL (ref 82–102)
NRBC # BLD: 0 K/UL (ref 0–0.2)
PLATELET # BLD AUTO: 223 K/UL (ref 150–450)
PMV BLD AUTO: 8.7 FL (ref 9.4–12.3)
RBC # BLD AUTO: 4.79 M/UL (ref 4.05–5.2)
SPECIMEN EXP DATE BLD: NORMAL
WBC # BLD AUTO: 16.3 K/UL (ref 4.3–11.1)

## 2023-08-05 PROCEDURE — 86901 BLOOD TYPING SEROLOGIC RH(D): CPT

## 2023-08-05 PROCEDURE — 7100000011 HC PHASE II RECOVERY - ADDTL 15 MIN

## 2023-08-05 PROCEDURE — 7210000100 HC LABOR FEE PER 1 HR

## 2023-08-05 PROCEDURE — 7220000101 HC DELIVERY VAGINAL/SINGLE

## 2023-08-05 PROCEDURE — 36415 COLL VENOUS BLD VENIPUNCTURE: CPT

## 2023-08-05 PROCEDURE — 99284 EMERGENCY DEPT VISIT MOD MDM: CPT

## 2023-08-05 PROCEDURE — 3700000025 EPIDURAL BLOCK: Performed by: ANESTHESIOLOGY

## 2023-08-05 PROCEDURE — 51701 INSERT BLADDER CATHETER: CPT

## 2023-08-05 PROCEDURE — 86900 BLOOD TYPING SEROLOGIC ABO: CPT

## 2023-08-05 PROCEDURE — 0KQM0ZZ REPAIR PERINEUM MUSCLE, OPEN APPROACH: ICD-10-PCS | Performed by: OBSTETRICS & GYNECOLOGY

## 2023-08-05 PROCEDURE — 6360000002 HC RX W HCPCS: Performed by: NURSE ANESTHETIST, CERTIFIED REGISTERED

## 2023-08-05 PROCEDURE — 00HU33Z INSERTION OF INFUSION DEVICE INTO SPINAL CANAL, PERCUTANEOUS APPROACH: ICD-10-PCS | Performed by: ANESTHESIOLOGY

## 2023-08-05 PROCEDURE — 1100000000 HC RM PRIVATE

## 2023-08-05 PROCEDURE — 2580000003 HC RX 258: Performed by: OBSTETRICS & GYNECOLOGY

## 2023-08-05 PROCEDURE — 6360000002 HC RX W HCPCS: Performed by: OBSTETRICS & GYNECOLOGY

## 2023-08-05 PROCEDURE — 86850 RBC ANTIBODY SCREEN: CPT

## 2023-08-05 PROCEDURE — 85027 COMPLETE CBC AUTOMATED: CPT

## 2023-08-05 PROCEDURE — 7100000010 HC PHASE II RECOVERY - FIRST 15 MIN

## 2023-08-05 RX ORDER — MISOPROSTOL 200 UG/1
200 TABLET ORAL PRN
Status: DISCONTINUED | OUTPATIENT
Start: 2023-08-05 | End: 2023-08-05

## 2023-08-05 RX ORDER — DOCUSATE SODIUM 100 MG/1
100 CAPSULE, LIQUID FILLED ORAL 2 TIMES DAILY
Status: DISCONTINUED | OUTPATIENT
Start: 2023-08-05 | End: 2023-08-07 | Stop reason: HOSPADM

## 2023-08-05 RX ORDER — METHYLERGONOVINE MALEATE 0.2 MG/ML
200 INJECTION INTRAVENOUS PRN
Status: DISCONTINUED | OUTPATIENT
Start: 2023-08-05 | End: 2023-08-05

## 2023-08-05 RX ORDER — POLYETHYLENE GLYCOL 3350 17 G/17G
17 POWDER, FOR SOLUTION ORAL DAILY
Status: DISCONTINUED | OUTPATIENT
Start: 2023-08-05 | End: 2023-08-05

## 2023-08-05 RX ORDER — SODIUM CHLORIDE 9 MG/ML
INJECTION, SOLUTION INTRAVENOUS PRN
Status: DISCONTINUED | OUTPATIENT
Start: 2023-08-05 | End: 2023-08-05

## 2023-08-05 RX ORDER — SODIUM CHLORIDE 0.9 % (FLUSH) 0.9 %
5-40 SYRINGE (ML) INJECTION EVERY 12 HOURS SCHEDULED
Status: DISCONTINUED | OUTPATIENT
Start: 2023-08-05 | End: 2023-08-05

## 2023-08-05 RX ORDER — SODIUM CHLORIDE, SODIUM LACTATE, POTASSIUM CHLORIDE, AND CALCIUM CHLORIDE .6; .31; .03; .02 G/100ML; G/100ML; G/100ML; G/100ML
500 INJECTION, SOLUTION INTRAVENOUS PRN
Status: DISCONTINUED | OUTPATIENT
Start: 2023-08-05 | End: 2023-08-05

## 2023-08-05 RX ORDER — ROPIVACAINE HYDROCHLORIDE 2 MG/ML
INJECTION, SOLUTION EPIDURAL; INFILTRATION; PERINEURAL PRN
Status: DISCONTINUED | OUTPATIENT
Start: 2023-08-05 | End: 2023-08-05 | Stop reason: SDUPTHER

## 2023-08-05 RX ORDER — ACETAMINOPHEN 325 MG/1
650 TABLET ORAL EVERY 6 HOURS PRN
Status: DISCONTINUED | OUTPATIENT
Start: 2023-08-05 | End: 2023-08-05 | Stop reason: SDUPTHER

## 2023-08-05 RX ORDER — METHYLERGONOVINE MALEATE 0.2 MG/ML
200 INJECTION INTRAVENOUS PRN
Status: DISCONTINUED | OUTPATIENT
Start: 2023-08-05 | End: 2023-08-07 | Stop reason: HOSPADM

## 2023-08-05 RX ORDER — ACETAMINOPHEN 500 MG
1000 TABLET ORAL EVERY 8 HOURS PRN
Status: DISCONTINUED | OUTPATIENT
Start: 2023-08-05 | End: 2023-08-07 | Stop reason: HOSPADM

## 2023-08-05 RX ORDER — TERBUTALINE SULFATE 1 MG/ML
0.25 INJECTION, SOLUTION SUBCUTANEOUS ONCE
Status: DISCONTINUED | OUTPATIENT
Start: 2023-08-05 | End: 2023-08-05

## 2023-08-05 RX ORDER — SODIUM CHLORIDE 0.9 % (FLUSH) 0.9 %
5-40 SYRINGE (ML) INJECTION PRN
Status: DISCONTINUED | OUTPATIENT
Start: 2023-08-05 | End: 2023-08-07 | Stop reason: HOSPADM

## 2023-08-05 RX ORDER — IBUPROFEN 600 MG/1
600 TABLET ORAL EVERY 6 HOURS
Status: DISCONTINUED | OUTPATIENT
Start: 2023-08-06 | End: 2023-08-07 | Stop reason: HOSPADM

## 2023-08-05 RX ORDER — SODIUM CHLORIDE 0.9 % (FLUSH) 0.9 %
5-40 SYRINGE (ML) INJECTION PRN
Status: DISCONTINUED | OUTPATIENT
Start: 2023-08-05 | End: 2023-08-05

## 2023-08-05 RX ORDER — OXYCODONE HYDROCHLORIDE 5 MG/1
5 TABLET ORAL EVERY 4 HOURS PRN
Status: DISCONTINUED | OUTPATIENT
Start: 2023-08-05 | End: 2023-08-07 | Stop reason: HOSPADM

## 2023-08-05 RX ORDER — DIPHENHYDRAMINE HCL 25 MG
25 CAPSULE ORAL EVERY 4 HOURS PRN
Status: DISCONTINUED | OUTPATIENT
Start: 2023-08-05 | End: 2023-08-05

## 2023-08-05 RX ORDER — MISOPROSTOL 200 UG/1
200 TABLET ORAL EVERY 6 HOURS PRN
Status: DISCONTINUED | OUTPATIENT
Start: 2023-08-05 | End: 2023-08-07 | Stop reason: HOSPADM

## 2023-08-05 RX ORDER — IBUPROFEN 800 MG/1
800 TABLET ORAL EVERY 8 HOURS SCHEDULED
Status: DISCONTINUED | OUTPATIENT
Start: 2023-08-05 | End: 2023-08-05

## 2023-08-05 RX ORDER — ROPIVACAINE HYDROCHLORIDE 2 MG/ML
INJECTION, SOLUTION EPIDURAL; INFILTRATION; PERINEURAL CONTINUOUS PRN
Status: DISCONTINUED | OUTPATIENT
Start: 2023-08-05 | End: 2023-08-05 | Stop reason: SDUPTHER

## 2023-08-05 RX ORDER — SODIUM CHLORIDE, SODIUM LACTATE, POTASSIUM CHLORIDE, CALCIUM CHLORIDE 600; 310; 30; 20 MG/100ML; MG/100ML; MG/100ML; MG/100ML
INJECTION, SOLUTION INTRAVENOUS CONTINUOUS
Status: DISCONTINUED | OUTPATIENT
Start: 2023-08-05 | End: 2023-08-05

## 2023-08-05 RX ORDER — DEXTROSE, SODIUM CHLORIDE, SODIUM LACTATE, POTASSIUM CHLORIDE, AND CALCIUM CHLORIDE 5; .6; .31; .03; .02 G/100ML; G/100ML; G/100ML; G/100ML; G/100ML
INJECTION, SOLUTION INTRAVENOUS CONTINUOUS
Status: DISCONTINUED | OUTPATIENT
Start: 2023-08-05 | End: 2023-08-05

## 2023-08-05 RX ORDER — LANOLIN
CREAM (ML) TOPICAL PRN
Status: DISCONTINUED | OUTPATIENT
Start: 2023-08-05 | End: 2023-08-07 | Stop reason: HOSPADM

## 2023-08-05 RX ORDER — ONDANSETRON 2 MG/ML
4 INJECTION INTRAMUSCULAR; INTRAVENOUS EVERY 6 HOURS PRN
Status: DISCONTINUED | OUTPATIENT
Start: 2023-08-05 | End: 2023-08-05

## 2023-08-05 RX ORDER — OXYCODONE HYDROCHLORIDE 5 MG/1
5 TABLET ORAL EVERY 6 HOURS PRN
Status: DISCONTINUED | OUTPATIENT
Start: 2023-08-05 | End: 2023-08-05

## 2023-08-05 RX ORDER — SODIUM CHLORIDE, SODIUM LACTATE, POTASSIUM CHLORIDE, AND CALCIUM CHLORIDE .6; .31; .03; .02 G/100ML; G/100ML; G/100ML; G/100ML
1000 INJECTION, SOLUTION INTRAVENOUS PRN
Status: DISCONTINUED | OUTPATIENT
Start: 2023-08-05 | End: 2023-08-05

## 2023-08-05 RX ADMIN — OXYTOCIN 4 MILLI-UNITS/MIN: 10 INJECTION INTRAVENOUS at 19:00

## 2023-08-05 RX ADMIN — SODIUM CHLORIDE, POTASSIUM CHLORIDE, SODIUM LACTATE AND CALCIUM CHLORIDE 500 ML: 600; 310; 30; 20 INJECTION, SOLUTION INTRAVENOUS at 16:59

## 2023-08-05 RX ADMIN — ROPIVACAINE HYDROCHLORIDE 10 ML: 2 INJECTION, SOLUTION EPIDURAL; INFILTRATION; PERINEURAL at 17:23

## 2023-08-05 RX ADMIN — ROPIVACAINE HYDROCHLORIDE 10 ML/HR: 2 INJECTION, SOLUTION EPIDURAL; INFILTRATION; PERINEURAL at 17:24

## 2023-08-05 NOTE — ANESTHESIA PROCEDURE NOTES
Epidural Block    Patient location during procedure: OB  Start time: 8/5/2023 5:10 PM  End time: 8/5/2023 5:15 PM  Reason for block: labor epidural  Staffing  Performed: anesthesiologist   Anesthesiologist: Casper Amezquita MD  Epidural  Patient position: sitting  Prep: ChloraPrep and site prepped and draped  Patient monitoring: cardiac monitor, continuous pulse ox and frequent blood pressure checks  Approach: midline  Location: L3-4  Injection technique: CAROLINA saline  Provider prep: mask and sterile gloves  Needle  Needle type: Tuohy   Needle gauge: 17 G  Needle length: 3.5 in  Needle insertion depth: 4 cm  Catheter type: none  Catheter size: 18 G  Catheter at skin depth: 9.5 cm  Test dose: negativeCatheter Secured: tape and tegaderm  Assessment  Sensory level: T8  Hemodynamics: stable  Attempts: 1  Outcomes: uncomplicated and patient tolerated procedure well  Preanesthetic Checklist  Completed: patient identified, IV checked, risks and benefits discussed, surgical/procedural consents, equipment checked, pre-op evaluation, timeout performed, anesthesia consent given, oxygen available, monitors applied/VS acknowledged and blood product R/B/A discussed and consented

## 2023-08-05 NOTE — H&P
History & Physical    Name: Adama Bella MRN: [id number]  SSN: [id number]    YOB: 1990  Age: 29 y.o. Sex: female      Subjective:     Reason for Triage visit:  39w1d and water broke    History of Present Illness: Ms. Vivian Cope is a 29 y.o.  female  with an estimated gestational age of 37w4d with Estimated Date of Delivery: 23. Patient states that her water broke at 3:15 PM today, clear and moderate amount. She has started having contractions, states every 10 minutes. Good FM. No vaginal bleeding. Prenatal care per [facility] and essentially uncomplicated except: Failed 1 hr GTT ---> passed 3 hr GTT (u/s completed yesterday  for suspected fetal macrosomia with EFW 48%ile, 7 lbs 6 oz)       Patient denies fever, headache , vaginal bleeding , and visual disturbances. OB History    Para Term  AB Living   1             SAB IAB Ectopic Molar Multiple Live Births                    # Outcome Date GA Lbr Maninder/2nd Weight Sex Delivery Anes PTL Lv   1 Current              History reviewed. No pertinent past medical history. History reviewed. No pertinent surgical history. Social History     Occupational History    Not on file   Tobacco Use    Smoking status: Never    Smokeless tobacco: Never   Vaping Use    Vaping Use: Never used   Substance and Sexual Activity    Alcohol use: Never    Drug use: Never    Sexual activity: Yes     Partners: Male      Family History   Problem Relation Age of Onset    Breast Cancer Neg Hx     Colon Cancer Neg Hx     Ovarian Cancer Neg Hx        No Known Allergies  Prior to Admission medications    Medication Sig Start Date End Date Taking? Authorizing Provider   Prenatal Vit-Fe Fumarate-FA (PRENATAL PO) Take by mouth    Historical Provider, MD        Review of Systems:  Complete review of systems performed. Those not specifically mentioned in the HPI are either negative are non related to this patient encounter.     Objective:     Vitals:    Vitals:    23

## 2023-08-05 NOTE — L&D DELIVERY NOTE
Ary Hancock Girl Nichelle [[id number]]      Labor Events     Labor: No   Steroids: None  Cervical Ripening Date/Time:      Antibiotics Received during Labor: No  Rupture Identifier: Sac 1  Rupture Date/Time:  23 15:15:00   Rupture Type: SROM  Fluid Color: Clear  Fluid Odor: None  Fluid Volume:  Moderate  Induction: None  Augmentation: Oxytocin  Labor Complications: None              Anesthesia    Method: Epidural       Labor Event Times      Labor onset date/time:  23 15:15:00 EDT     Dilation complete date/time:  23 17:43:00     Start pushing date/time:  2023 18:14:00   Decision date/time (emergent ):            Delivery Details      Delivery Date: 23 Delivery Time: 19:12:00   Delivery Type: Vaginal, Spontaneous              [place] Presentation    Presentation: Vertex  Position: Left  _: Occiput  _: Anterior       Shoulder Dystocia    Shoulder Dystocia Present?: No       Assisted Delivery Details    Forceps Attempted?: No  Vacuum Extractor Attempted?: No                           Cord    Vessels: 3 Vessels  Complications: None  Delayed Cord Clamping?: Yes  Cord Blood Disposition: Lab  Gases Sent?: No              Placenta    Date/Time: 2023 19:14:00  Removal: Spontaneous  Appearance: Intact  Disposition: Discarded       Lacerations    Episiotomy: None  Perineal Lacerations: 2nd  Other Lacerations: no non-perineal laceration  Number of Repair Packets: 2       Vaginal Counts    Initial Count Personnel: Marty Malcolm CST  Initial Count Verified By: [facility] RN  Intial Sponge Count: Correct Intial Needles Count: Correct Intial Instruments Count: Correct   Final Sponges Count: Correct Final Needles  Count: Correct Final Instruments Count: Correct   Final Count Personnel: Mary Quinones  Final Count Verified ByNasreen Decker RN  Accurate Final Count?: Yes       Blood Loss  Mother: Yumiko Lambert #[id number]     Start of Mother's Information      Delivery Blood Loss  23 [id number] -

## 2023-08-05 NOTE — L&D DELIVERY NOTE
[facility], [address] [facility], [address] [place] [address]  Corinne Morocco, MD, NISREEN Bravo-BC  Sri Jha MD, FACOG  Delivery Note    Present for entire delivery. Details in delivery summary. . Viable female infant, APGARS 9,9 .   Weight 8 lbs., 2 oz. EBL:  250 mL  Pain mgmt:   epidural    Placenta spont, intact, 3VC. 2nd degree midline perineal laceration repaired with 3-0 vicryl rapide in usual fashion    Pt and infant stable.       Caroline Watson MD   7:28 PM  23

## 2023-08-05 NOTE — CONSULTS
Session ID: [id number]  Language: Mandarin   ID: #[id number]   Name: [facility] AT [facility]

## 2023-08-06 LAB
ALBUMIN SERPL-MCNC: 2.3 G/DL (ref 3.5–5)
ALBUMIN/GLOB SERPL: 0.6 (ref 0.4–1.6)
ALP SERPL-CCNC: 170 U/L (ref 50–130)
ALT SERPL-CCNC: 15 U/L (ref 12–65)
ANION GAP SERPL CALC-SCNC: 8 MMOL/L (ref 2–11)
AST SERPL-CCNC: 24 U/L (ref 15–37)
BASOPHILS # BLD: 0 K/UL (ref 0–0.2)
BASOPHILS NFR BLD: 0 % (ref 0–2)
BILIRUB SERPL-MCNC: 0.4 MG/DL (ref 0.2–1.1)
BUN SERPL-MCNC: 7 MG/DL (ref 6–23)
CALCIUM SERPL-MCNC: 8.9 MG/DL (ref 8.3–10.4)
CHLORIDE SERPL-SCNC: 109 MMOL/L (ref 101–110)
CO2 SERPL-SCNC: 24 MMOL/L (ref 21–32)
CREAT SERPL-MCNC: 0.49 MG/DL (ref 0.6–1)
DIFFERENTIAL METHOD BLD: ABNORMAL
EOSINOPHIL # BLD: 0.1 K/UL (ref 0–0.8)
EOSINOPHIL NFR BLD: 0 % (ref 0.5–7.8)
ERYTHROCYTE [DISTWIDTH] IN BLOOD BY AUTOMATED COUNT: 13.5 % (ref 11.9–14.6)
GLOBULIN SER CALC-MCNC: 3.7 G/DL (ref 2.8–4.5)
GLUCOSE SERPL-MCNC: 104 MG/DL (ref 65–100)
HCT VFR BLD AUTO: 38.1 % (ref 35.8–46.3)
HGB BLD-MCNC: 13 G/DL (ref 11.7–15.4)
IMM GRANULOCYTES # BLD AUTO: 0.1 K/UL (ref 0–0.5)
IMM GRANULOCYTES NFR BLD AUTO: 1 % (ref 0–5)
LYMPHOCYTES # BLD: 2 K/UL (ref 0.5–4.6)
LYMPHOCYTES NFR BLD: 12 % (ref 13–44)
MCH RBC QN AUTO: 30.2 PG (ref 26.1–32.9)
MCHC RBC AUTO-ENTMCNC: 34.1 G/DL (ref 31.4–35)
MCV RBC AUTO: 88.6 FL (ref 82–102)
MONOCYTES # BLD: 1.4 K/UL (ref 0.1–1.3)
MONOCYTES NFR BLD: 8 % (ref 4–12)
NEUTS SEG # BLD: 13.4 K/UL (ref 1.7–8.2)
NEUTS SEG NFR BLD: 79 % (ref 43–78)
NRBC # BLD: 0 K/UL (ref 0–0.2)
PLATELET # BLD AUTO: 195 K/UL (ref 150–450)
PMV BLD AUTO: 8.7 FL (ref 9.4–12.3)
POTASSIUM SERPL-SCNC: 3.4 MMOL/L (ref 3.5–5.1)
PROT SERPL-MCNC: 6 G/DL (ref 6.3–8.2)
RBC # BLD AUTO: 4.3 M/UL (ref 4.05–5.2)
SODIUM SERPL-SCNC: 141 MMOL/L (ref 133–143)
URATE SERPL-MCNC: 4.7 MG/DL (ref 2.6–6)
WBC # BLD AUTO: 17 K/UL (ref 4.3–11.1)

## 2023-08-06 PROCEDURE — 84550 ASSAY OF BLOOD/URIC ACID: CPT

## 2023-08-06 PROCEDURE — 36415 COLL VENOUS BLD VENIPUNCTURE: CPT

## 2023-08-06 PROCEDURE — 80053 COMPREHEN METABOLIC PANEL: CPT

## 2023-08-06 PROCEDURE — 1100000000 HC RM PRIVATE

## 2023-08-06 PROCEDURE — 6370000000 HC RX 637 (ALT 250 FOR IP): Performed by: OBSTETRICS & GYNECOLOGY

## 2023-08-06 PROCEDURE — 85025 COMPLETE CBC W/AUTO DIFF WBC: CPT

## 2023-08-06 RX ORDER — FUROSEMIDE 20 MG/1
20 TABLET ORAL DAILY
Status: DISCONTINUED | OUTPATIENT
Start: 2023-08-06 | End: 2023-08-07 | Stop reason: HOSPADM

## 2023-08-06 RX ADMIN — IBUPROFEN 600 MG: 600 TABLET, FILM COATED ORAL at 04:36

## 2023-08-06 RX ADMIN — IBUPROFEN 600 MG: 600 TABLET, FILM COATED ORAL at 10:44

## 2023-08-06 RX ADMIN — FUROSEMIDE 20 MG: 20 TABLET ORAL at 10:47

## 2023-08-06 RX ADMIN — Medication: at 00:18

## 2023-08-06 RX ADMIN — DOCUSATE SODIUM 100 MG: 100 CAPSULE, LIQUID FILLED ORAL at 10:44

## 2023-08-06 RX ADMIN — WITCH HAZEL: 500 SOLUTION RECTAL; TOPICAL at 00:18

## 2023-08-06 RX ADMIN — IBUPROFEN 600 MG: 600 TABLET, FILM COATED ORAL at 18:40

## 2023-08-06 RX ADMIN — WITCH HAZEL: 500 SOLUTION RECTAL; TOPICAL at 17:24

## 2023-08-06 RX ADMIN — DOCUSATE SODIUM 100 MG: 100 CAPSULE, LIQUID FILLED ORAL at 18:41

## 2023-08-06 NOTE — LACTATION NOTE

## 2023-08-06 NOTE — LACTATION NOTE
This note was copied from a baby's chart. Lactation visit. Baby asleep. Mom doing breast and bottle feeding formula. Mom states baby does latch well. RN observed and said baby latched well earlier today. Discussed feeding needs. Feed every 3 hours. Offer the breast first at all feeds to give baby colostrum and stimulate milk production. No medical need to supplement if baby latching well. Always supplement post nursing. Mom states understanding. Offered help with breastfeeding if needed.

## 2023-08-07 VITALS
RESPIRATION RATE: 18 BRPM | HEART RATE: 69 BPM | OXYGEN SATURATION: 98 % | SYSTOLIC BLOOD PRESSURE: 131 MMHG | DIASTOLIC BLOOD PRESSURE: 87 MMHG | TEMPERATURE: 97.9 F

## 2023-08-07 PROCEDURE — 6370000000 HC RX 637 (ALT 250 FOR IP): Performed by: OBSTETRICS & GYNECOLOGY

## 2023-08-07 RX ORDER — FUROSEMIDE 20 MG/1
20 TABLET ORAL DAILY
Qty: 3 TABLET | Refills: 0 | Status: SHIPPED | OUTPATIENT
Start: 2023-08-08 | End: 2023-08-11

## 2023-08-07 RX ORDER — LABETALOL 100 MG/1
100 TABLET, FILM COATED ORAL 2 TIMES DAILY
Status: DISCONTINUED | OUTPATIENT
Start: 2023-08-07 | End: 2023-08-07

## 2023-08-07 RX ORDER — IBUPROFEN 800 MG/1
800 TABLET ORAL EVERY 8 HOURS PRN
Qty: 100 TABLET | Refills: 0 | Status: SHIPPED | OUTPATIENT
Start: 2023-08-07

## 2023-08-07 RX ORDER — OXYCODONE HYDROCHLORIDE 5 MG/1
5 TABLET ORAL EVERY 8 HOURS PRN
Qty: 6 TABLET | Refills: 0 | Status: SHIPPED | OUTPATIENT
Start: 2023-08-07 | End: 2023-08-10

## 2023-08-07 RX ADMIN — IBUPROFEN 600 MG: 600 TABLET, FILM COATED ORAL at 05:25

## 2023-08-07 RX ADMIN — ACETAMINOPHEN 1000 MG: 500 TABLET, FILM COATED ORAL at 08:44

## 2023-08-07 RX ADMIN — DOCUSATE SODIUM 100 MG: 100 CAPSULE, LIQUID FILLED ORAL at 08:44

## 2023-08-07 RX ADMIN — WITCH HAZEL: 500 SOLUTION RECTAL; TOPICAL at 02:23

## 2023-08-07 RX ADMIN — FUROSEMIDE 20 MG: 20 TABLET ORAL at 08:44

## 2023-08-07 NOTE — LACTATION NOTE
This note was copied from a baby's chart. In to see mom and infant for discharge. Mom doing breast feeding and supplementing w/ formula. Baby fussy. Assisted mom in her learning football hold and how to get baby on wide and deep. Baby w/ good latch but sleepy sucks at times. Reviewed how to stimulate baby to stay sucking strong and nutritive. Encouraged mom to feed baby 8-12 full feeds each day, monitor output. Offer both breasts first. If baby still hungry dad can continue to offer formula/EBM while mom is pumping for extra stimulation for extra food baby getting. Reviewed breast milk storage rules. Reviewed how to care for abrasion on nipple to prevent infection and promote healing. Mom has pump at home to use if needed for poor feeding or nipple rest to pump and feed back to baby. Reviewed discharge info and how to manage period of engorgement. No further needs at this time. Mom continues to feed at this time.

## 2023-08-07 NOTE — DISCHARGE SUMMARY
Discharge Summary     Date of Admission:  2023  3:32 PM  Date of Discharge:  2023       Sidney Melton 29 y.o. Tamra Florian presented at 39w1d for induction/in active labor. Pt had  without incident. See delivery note for all delivery details. Pt's PP course was uneventful. On day of D/C, she was ambulating well, afebrile, with lochia < menses. She was discharged home with medications as below. Pt was breast feeding on discharge. She plans on unsure for birth control. HTN diagnosis: no but bp's labile with some in 140s over low 90s  Routine PP instructions given to patient. She is to follow up with ob within a wk for bp check    No problem-specific Assessment & Plan notes found for this encounter. Medication List        START taking these medications      furosemide 20 MG tablet  Commonly known as: LASIX  Take 1 tablet by mouth daily for 3 days  Start taking on: 2023     ibuprofen 800 MG tablet  Commonly known as: ADVIL;MOTRIN  Take 1 tablet by mouth every 8 hours as needed for Pain     oxyCODONE 5 MG immediate release tablet  Commonly known as: ROXICODONE  Take 1 tablet by mouth every 8 hours as needed for Pain for up to 3 days. Max Daily Amount: 15 mg            CONTINUE taking these medications      PRENATAL PO               Where to Get Your Medications        These medications were sent to [facility]/pharmacy #[id number]- [address], [address] [place] [address] [address] [place] [address] -  [phone] - F [phone]  [address] [place] [address], [address],  [address] [place] [address]      Phone: [phone]   furosemide 20 MG tablet  ibuprofen 800 MG tablet  oxyCODONE 5 MG immediate release tablet         Jeanelle Collet, MD  11:28 AM  23

## 2023-08-07 NOTE — LACTATION NOTE

## 2023-08-07 NOTE — CARE COORDINATION
Chart reviewed - first time parent. SW met with patient/FOB to complete initial assessment.  provided education on [facility] Postpartum [facility] Home Visit. Family would like to participate in program.  Referral made today. Patient given informational packet on  mood & anxiety disorders (resources/education). Patient provided EPDS in [facility]. Score = 8. Family denies any additional needs from  at this time. Family has 's contact information should any needs/questions arise.     STEVAN Gill, [facility]   [phone]

## 2023-08-07 NOTE — DISCHARGE INSTRUCTIONS
After Your Delivery (the Postpartum Period): Care Instructions  Overview     Congratulations on the birth of your baby. Like pregnancy, the  period can be a time of excitement, freddie, and exhaustion. You may look at your wondrous little baby and feel happy. You may also be overwhelmed by your new sleep hours and new responsibilities. At first, babies often sleep during the days and are awake at night. They do not have a pattern or routine. They may make sudden gasps, jerk themselves awake, or look like they have crossed eyes. These are all normal, and they may even make you smile. In these first weeks after delivery, try to take good care of yourself. It may take 4 to 6 weeks to feel like yourself again, and possibly longer if you had a  birth. You will likely feel very tired for several weeks. Your days will be full of ups and downs, but lots of freddie as well. Follow-up care is a key part of your treatment and safety. Be sure to make and go to all appointments, and call your doctor if you are having problems. It's also a good idea to know your test results and keep a list of the medicines you take. How can you care for yourself at home? Take care of your body after delivery  Use pads instead of tampons for the bloody flow that may last as long as 2 weeks. Ease cramps with ibuprofen (Advil, Motrin). Ease soreness of hemorrhoids and the area between your vagina and rectum with ice compresses or witch hazel pads. Ease constipation by drinking lots of fluid and eating high-fiber foods. Ask your doctor about over-the-counter stool softeners. Cleanse yourself with a gentle squeeze of warm water from a bottle instead of wiping with toilet paper. Take a sitz bath in warm water several times a day. Wear a good nursing bra. Ease sore and swollen breasts with warm, wet washcloths. If you aren't breastfeeding, use ice rather than heat for breast soreness.   Your period may not start for several

## 2023-08-09 ENCOUNTER — TELEPHONE (OUTPATIENT)
Dept: OBGYN CLINIC | Age: 33
End: 2023-08-09

## 2023-08-09 ENCOUNTER — POSTPARTUM VISIT (OUTPATIENT)
Dept: OBGYN CLINIC | Age: 33
End: 2023-08-09

## 2023-08-09 VITALS
SYSTOLIC BLOOD PRESSURE: 118 MMHG | HEIGHT: 67 IN | BODY MASS INDEX: 22.6 KG/M2 | WEIGHT: 144 LBS | DIASTOLIC BLOOD PRESSURE: 76 MMHG

## 2023-08-09 DIAGNOSIS — K59.09 OTHER CONSTIPATION: ICD-10-CM

## 2023-08-09 DIAGNOSIS — Z01.30 BLOOD PRESSURE CHECK: Primary | ICD-10-CM

## 2023-08-09 DIAGNOSIS — O92.13 CRACKED NIPPLE ASSOCIATED WITH LACTATION: ICD-10-CM

## 2023-08-09 RX ORDER — DOCUSATE SODIUM 100 MG/1
100 CAPSULE, LIQUID FILLED ORAL 2 TIMES DAILY
Qty: 60 CAPSULE | Refills: 0 | Status: SHIPPED | OUTPATIENT
Start: 2023-08-09 | End: 2023-09-08

## 2023-08-09 RX ORDER — LANOLIN
CREAM (GRAM) TOPICAL
Qty: 1 EACH | Refills: 2 | Status: SHIPPED | OUTPATIENT
Start: 2023-08-09

## 2023-08-09 NOTE — TELEPHONE ENCOUNTER
Contacted pt regarding missed appointment. Pt was unaware of appointment. Reschedule for later this afternoon. No show letter sent via [employer].

## 2023-08-09 NOTE — TELEPHONE ENCOUNTER
----- Message from NORA Addison NP sent at 8/9/2023  8:55 AM EDT -----  Regarding: PP Blood Pressure check  The above patient was scheduled for a pp blood pressure check today and did not show.      Thank you,  Felicity

## 2023-08-09 NOTE — PROGRESS NOTES
CC: Blood Pressure Check     Cheryl Reaves is a 29 y.o. Jasmine Jesus  who is here today for blood pressure check. Delivered: 23 via  at 39w1d for SROM/in active labor. Pregnancy essentially uncomplicated except for: Failed 1 hr GTT ---> passed 3 hr GTT (u/s completed  for suspected fetal macrosomia with EFW 48%ile, 7 lbs 6 oz). Also had HTN diagnosis however, BP's labile with some in 140s/low 90s. Please see prenatal records for details. Magnesium: no     Dc'd home on Lasix. Has completed 2 days of 3 day course of Lasix since discharge from hospital.      Patient breast feeding, pumping and supplementing with formula. She does state she has cracked nipples which occurred during nursing. She also informs me that she noticed \"a swollen lymph node under my arm pits since coming home from the hospital.\" She denies fevers, chills, breast erythema, lesions, rashes or abscesses. Patient doing well postpartum without significant complaints. Voiding without difficulty. Pain controlled with tylenol and ibuprofen. Light lochia. Patient states she has had an issue with constipation since delivery. States she has not had a bowel movement since Friday (5 days) and reports that she normally has bowel movements daily. She has not been taking any stool softener or OTC medication to assist with bowel movements. Denies n/v, tolerating regular diet. Denies any si/sx pp depression. Denies SI/HI. States her parents are in town and have been helping and has good support from her  as well. ROS:    Breast: Denies pain, lump or nipple discharge. Positive for cracked nipples bilaterally due to nursing. GYN: Denies pelvic pain, discharge, itching, odor or dysuria. Constitutional: Negative for chills and fever. HENT: Negative for severe headaches or vision changes    Respiratory: Negative for cough and shortness of breath. Cardiovascular: Negative for chest pain and palpitations.

## 2023-09-06 RX ORDER — IBUPROFEN 800 MG/1
800 TABLET ORAL EVERY 8 HOURS PRN
Qty: 100 TABLET | Refills: 0 | OUTPATIENT
Start: 2023-09-06

## 2023-09-11 ENCOUNTER — POSTPARTUM VISIT (OUTPATIENT)
Dept: OBGYN CLINIC | Age: 33
End: 2023-09-11

## 2023-09-11 VITALS
HEIGHT: 67 IN | BODY MASS INDEX: 20.72 KG/M2 | SYSTOLIC BLOOD PRESSURE: 130 MMHG | DIASTOLIC BLOOD PRESSURE: 74 MMHG | WEIGHT: 132 LBS

## 2023-09-11 PROCEDURE — 99902 PR PRENATAL VISIT: CPT | Performed by: OBSTETRICS & GYNECOLOGY

## 2023-09-19 ASSESSMENT — ENCOUNTER SYMPTOMS
GASTROINTESTINAL NEGATIVE: 1
RESPIRATORY NEGATIVE: 1
EYES NEGATIVE: 1
ALLERGIC/IMMUNOLOGIC NEGATIVE: 1

## 2024-06-14 ENCOUNTER — APPOINTMENT (OUTPATIENT)
Dept: ULTRASOUND IMAGING | Age: 34
End: 2024-06-14
Payer: COMMERCIAL

## 2024-06-14 ENCOUNTER — HOSPITAL ENCOUNTER (EMERGENCY)
Age: 34
Discharge: HOME | End: 2024-06-14
Payer: COMMERCIAL

## 2024-06-14 VITALS
SYSTOLIC BLOOD PRESSURE: 122 MMHG | DIASTOLIC BLOOD PRESSURE: 68 MMHG | HEIGHT: 67 IN | BODY MASS INDEX: 17.27 KG/M2 | RESPIRATION RATE: 18 BRPM | WEIGHT: 110 LBS | TEMPERATURE: 99 F | HEART RATE: 68 BPM | OXYGEN SATURATION: 97 %

## 2024-06-14 DIAGNOSIS — O20.0 THREATENED MISCARRIAGE: Primary | ICD-10-CM

## 2024-06-14 LAB
ABO + RH BLD: NORMAL
ANION GAP SERPL CALC-SCNC: 13 MMOL/L (ref 2–11)
APPEARANCE UR: CLEAR
BILIRUB UR QL: NEGATIVE
BUN SERPL-MCNC: 13 MG/DL (ref 6–23)
CALCIUM SERPL-MCNC: 9.3 MG/DL (ref 8.3–10.4)
CHLORIDE SERPL-SCNC: 103 MMOL/L (ref 98–107)
CO2 SERPL-SCNC: 22 MMOL/L (ref 21–32)
COLOR UR: YELLOW
CREAT SERPL-MCNC: 0.45 MG/DL (ref 0.6–1)
ERYTHROCYTE [DISTWIDTH] IN BLOOD BY AUTOMATED COUNT: 12.5 % (ref 11.9–14.6)
GLUCOSE SERPL-MCNC: 100 MG/DL (ref 65–100)
GLUCOSE UR STRIP.AUTO-MCNC: NEGATIVE MG/DL
HCG SERPL-ACNC: ABNORMAL MIU/ML (ref 0–6)
HCG UR QL: POSITIVE
HCT VFR BLD AUTO: 40.6 % (ref 35.8–46.3)
HGB BLD-MCNC: 13.4 G/DL (ref 11.7–15.4)
HGB UR QL STRIP: NEGATIVE
KETONES UR QL STRIP.AUTO: NEGATIVE MG/DL
LEUKOCYTE ESTERASE UR QL STRIP.AUTO: NEGATIVE
MCH RBC QN AUTO: 29.5 PG (ref 26.1–32.9)
MCHC RBC AUTO-ENTMCNC: 33 G/DL (ref 31.4–35)
MCV RBC AUTO: 89.4 FL (ref 82–102)
NITRITE UR QL STRIP.AUTO: NEGATIVE
NRBC # BLD: 0 K/UL (ref 0–0.2)
PH UR STRIP: 5.5 (ref 5–9)
PLATELET # BLD AUTO: 251 K/UL (ref 150–450)
PMV BLD AUTO: 9.2 FL (ref 9.4–12.3)
POTASSIUM SERPL-SCNC: 4.2 MMOL/L (ref 3.5–5.1)
PROT UR STRIP-MCNC: NEGATIVE MG/DL
RBC # BLD AUTO: 4.54 M/UL (ref 4.05–5.2)
SODIUM SERPL-SCNC: 138 MMOL/L (ref 133–143)
SP GR UR REFRACTOMETRY: 1.02 (ref 1–1.02)
UROBILINOGEN UR QL STRIP.AUTO: 0.2 EU/DL (ref 0.2–1)
WBC # BLD AUTO: 9.3 K/UL (ref 4.3–11.1)

## 2024-06-14 PROCEDURE — 86900 BLOOD TYPING SEROLOGIC ABO: CPT

## 2024-06-14 PROCEDURE — 84702 CHORIONIC GONADOTROPIN TEST: CPT

## 2024-06-14 PROCEDURE — 81025 URINE PREGNANCY TEST: CPT

## 2024-06-14 PROCEDURE — 76815 OB US LIMITED FETUS(S): CPT

## 2024-06-14 PROCEDURE — 80048 BASIC METABOLIC PNL TOTAL CA: CPT

## 2024-06-14 PROCEDURE — 86901 BLOOD TYPING SEROLOGIC RH(D): CPT

## 2024-06-14 PROCEDURE — 99284 EMERGENCY DEPT VISIT MOD MDM: CPT

## 2024-06-14 PROCEDURE — 81003 URINALYSIS AUTO W/O SCOPE: CPT

## 2024-06-14 PROCEDURE — 85027 COMPLETE CBC AUTOMATED: CPT

## 2024-06-14 ASSESSMENT — ENCOUNTER SYMPTOMS
PHOTOPHOBIA: 0
FACIAL SWELLING: 0
COUGH: 0
SHORTNESS OF BREATH: 0
VOMITING: 0
TROUBLE SWALLOWING: 0
ABDOMINAL PAIN: 1

## 2024-06-14 ASSESSMENT — PAIN SCALES - GENERAL: PAINLEVEL_OUTOF10: 4

## 2024-06-14 ASSESSMENT — PAIN DESCRIPTION - PAIN TYPE: TYPE: ACUTE PAIN

## 2024-06-14 ASSESSMENT — PAIN DESCRIPTION - LOCATION: LOCATION: ABDOMEN

## 2024-06-14 NOTE — ED PROVIDER NOTES
Emergency Department Provider Note       PCP: Unknown, Unknown (Inactive)   Age: 33 y.o.   Sex: female     DISPOSITION Decision To Discharge 06/14/2024 08:01:04 PM       ICD-10-CM    1. Threatened miscarriage  O20.0           Medical Decision Making     In summary this is a 33-year-old female patient who presented for evaluation of vaginal bleeding in early pregnancy.  On exam she is very well-appearing without significant abdominal tenderness and no peritoneal signs.  She has not had any syncope and described amount of bleeding relatively minor.  She does not have a severe anemia.  Her vitals are stable.  Her quantitative hCG is greater than 50,000.  Her ultrasound does show intrauterine pregnancy with normal fetal heart rate.  No concerning findings for heterotopic pregnancy at this point.  She does have a subchorionic hemorrhage which could be the source of the bleeding.  However, discussed with her that the miscarriage cannot be fully ruled out.  We will bring her back in 2 days to have a repeat hCG level tested.  Counseled on signs to return sooner for.  The patient has verbalized understanding and agreed to the plan.  Discharged in stable condition.  ED Course as of 06/14/24 2043 Fri Jun 14, 2024   1723 Hemoglobin Quant: 13.4 [NR]   1816 6:16 PM  Chart review shows patient is B positive.  No indication for RhoGAM [NR]      ED Course User Index  [NR] Jasiel Kauffman PA     1 acute complicated illness or injury.  Patient was discharged risks and benefits of hospitalization were considered.  Shared medical decision making was utilized in creating the patients health plan today.  ED attending physician present in department at time of care. Based on current hospital policy, their co-signature is not required on this note.   I independently ordered and reviewed each unique test.     The patients assessment required an independent historian: spouse.  The reason they were needed is important historical

## 2024-06-14 NOTE — ED TRIAGE NOTES
Pt coming in for vaginal bleeding that started this afternoon pt is 4-6 weeks pregnant. Pt is ambulatory with steady gait. Pt states using a pad an hour and blood is more brown in color than bright red. Pt has another kid who is 10 months old. Pt states burning but no pain or cramps at this time. Pt denies any urinary issues.

## 2024-06-15 NOTE — DISCHARGE INSTRUCTIONS
Your ultrasound did show a pregnancy inside the uterus.  You will still need to come back in 2 days for recheck of your pregnancy hormone level.  Return sooner for new or worsening symptoms.    As we discussed, I did not find a life threatening cause of your symptoms today. However, THAT DOES NOT MEAN IT COULD NOT DEVELOP. If you develop ANY new or worsening symptoms, it is critical that you return for re-evaluation. This includes any symptoms that are concerning to you, especially symptoms such as worsening abdominal pain, loss of consciousness, fevers, profuse bleeding.  If you do not return for re-evaluation, you risk serious complications, including death.

## 2024-06-16 ENCOUNTER — HOSPITAL ENCOUNTER (EMERGENCY)
Age: 34
Discharge: HOME | End: 2024-06-16
Payer: COMMERCIAL

## 2024-06-16 VITALS
RESPIRATION RATE: 16 BRPM | TEMPERATURE: 98.4 F | WEIGHT: 110 LBS | HEART RATE: 75 BPM | HEIGHT: 67 IN | BODY MASS INDEX: 17.27 KG/M2 | DIASTOLIC BLOOD PRESSURE: 62 MMHG | SYSTOLIC BLOOD PRESSURE: 108 MMHG | OXYGEN SATURATION: 98 %

## 2024-06-16 DIAGNOSIS — O20.9 VAGINAL BLEEDING AFFECTING EARLY PREGNANCY: Primary | ICD-10-CM

## 2024-06-16 LAB
BASOPHILS # BLD: 0 K/UL (ref 0–0.2)
BASOPHILS NFR BLD: 0 % (ref 0–2)
DIFFERENTIAL METHOD BLD: ABNORMAL
EOSINOPHIL # BLD: 0.1 K/UL (ref 0–0.8)
EOSINOPHIL NFR BLD: 1 % (ref 0.5–7.8)
ERYTHROCYTE [DISTWIDTH] IN BLOOD BY AUTOMATED COUNT: 12.7 % (ref 11.9–14.6)
HCG SERPL-ACNC: ABNORMAL MIU/ML (ref 0–6)
HCT VFR BLD AUTO: 39.6 % (ref 35.8–46.3)
HGB BLD-MCNC: 13.1 G/DL (ref 11.7–15.4)
IMM GRANULOCYTES # BLD AUTO: 0 K/UL (ref 0–0.5)
IMM GRANULOCYTES NFR BLD AUTO: 0 % (ref 0–5)
LYMPHOCYTES # BLD: 2.5 K/UL (ref 0.5–4.6)
LYMPHOCYTES NFR BLD: 27 % (ref 13–44)
MCH RBC QN AUTO: 29.8 PG (ref 26.1–32.9)
MCHC RBC AUTO-ENTMCNC: 33.1 G/DL (ref 31.4–35)
MCV RBC AUTO: 90 FL (ref 82–102)
MONOCYTES # BLD: 0.7 K/UL (ref 0.1–1.3)
MONOCYTES NFR BLD: 8 % (ref 4–12)
NEUTS SEG # BLD: 5.9 K/UL (ref 1.7–8.2)
NEUTS SEG NFR BLD: 64 % (ref 43–78)
NRBC # BLD: 0 K/UL (ref 0–0.2)
PLATELET # BLD AUTO: 247 K/UL (ref 150–450)
PMV BLD AUTO: 8.6 FL (ref 9.4–12.3)
RBC # BLD AUTO: 4.4 M/UL (ref 4.05–5.2)
WBC # BLD AUTO: 9.3 K/UL (ref 4.3–11.1)

## 2024-06-16 PROCEDURE — 84702 CHORIONIC GONADOTROPIN TEST: CPT

## 2024-06-16 PROCEDURE — 85025 COMPLETE CBC W/AUTO DIFF WBC: CPT

## 2024-06-16 PROCEDURE — 99283 EMERGENCY DEPT VISIT LOW MDM: CPT

## 2024-06-16 ASSESSMENT — PAIN DESCRIPTION - ORIENTATION: ORIENTATION: LOWER

## 2024-06-16 ASSESSMENT — LIFESTYLE VARIABLES
HOW MANY STANDARD DRINKS CONTAINING ALCOHOL DO YOU HAVE ON A TYPICAL DAY: 1 OR 2
HOW OFTEN DO YOU HAVE A DRINK CONTAINING ALCOHOL: MONTHLY OR LESS

## 2024-06-16 ASSESSMENT — PAIN DESCRIPTION - LOCATION: LOCATION: ABDOMEN

## 2024-06-16 ASSESSMENT — PAIN SCALES - GENERAL
PAINLEVEL_OUTOF10: 1
PAINLEVEL_OUTOF10: 1

## 2024-06-16 ASSESSMENT — PAIN - FUNCTIONAL ASSESSMENT: PAIN_FUNCTIONAL_ASSESSMENT: 0-10

## 2024-06-16 NOTE — ED PROVIDER NOTES
Emergency Department Provider Note       PCP: Unknown, Unknown (Inactive)   Age: 33 y.o.   Sex: female     DISPOSITION Decision To Discharge 2024 05:27:26 PM       ICD-10-CM    1. Vaginal bleeding affecting early pregnancy  O20.9           Medical Decision Making     In summary this is a 33-year-old female patient who presented for reevaluation of vaginal bleeding in early pregnancy.  At last visit was found to have IUP with subchorionic hemorrhage.  Today her hCG has increased.  This is reassuring for the pregnancy.  Do not feel repeat ultrasound indicated.  Low suspicion for ectopic pregnancy given known IUP and no significant tenderness on exam.  She has improvement of her bleeding and states it is almost completely stopped.  She will follow-up with her OB/GYN.  Counseled on signs to return here for.  Family has verbalized understanding and agreed to the plan.  Discharged in stable condition.  ED Course as of 24 1734   Sun 2024   1710 Hemoglobin Quant: 13.1 [NR]      ED Course User Index  [NR] Jasiel Kauffman PA     1 or more acute illnesses that pose a threat to life or bodily function.   Patient was discharged risks and benefits of hospitalization were considered.  Shared medical decision making was utilized in creating the patients health plan today.  Considerations: The following items were considered but not ordered: Ultrasound.  ED attending physician present in department at time of care. Based on current hospital policy, their co-signature is not required on this note.   I independently ordered and reviewed each unique test.     The patients assessment required an independent historian: spouse.  The reason they were needed is important historical information not provided by the patient.                History     33-year-old  approximately 7-week pregnant female presents today for recheck of hCG level.  She states she was here 2 days ago due to vaginal bleeding and abdominal

## 2024-06-16 NOTE — ED TRIAGE NOTES
Pt ambulatory to triage with spouse. Pt states she was here two days ago for threatened miscarriage and was told to come back today for HCG level. Pt reports small amount vaginal bleeding and lower abdominal cramping.

## 2024-06-16 NOTE — DISCHARGE INSTRUCTIONS
Your pregnancy hormone continues to increase.  Be sure to follow-up with your OB/GYN.  Return here for new or worsening symptoms.    As we discussed, I did not find a life threatening cause of your symptoms today. However, THAT DOES NOT MEAN IT COULD NOT DEVELOP. If you develop ANY new or worsening symptoms, it is critical that you return for re-evaluation. This includes any symptoms that are concerning to you, especially symptoms such as severe abdominal pain, loss of consciousness, worsening vaginal bleeding.  If you do not return for re-evaluation, you risk serious complications, including death.

## 2024-06-21 ENCOUNTER — PROCEDURE VISIT (OUTPATIENT)
Dept: OBGYN CLINIC | Age: 34
End: 2024-06-21
Payer: COMMERCIAL

## 2024-06-21 ENCOUNTER — OFFICE VISIT (OUTPATIENT)
Dept: OBGYN CLINIC | Age: 34
End: 2024-06-21

## 2024-06-21 VITALS
HEIGHT: 67 IN | SYSTOLIC BLOOD PRESSURE: 104 MMHG | WEIGHT: 117 LBS | BODY MASS INDEX: 18.36 KG/M2 | DIASTOLIC BLOOD PRESSURE: 62 MMHG

## 2024-06-21 DIAGNOSIS — N92.6 MISSED MENSES: Primary | ICD-10-CM

## 2024-06-21 DIAGNOSIS — O09.891 SHORT INTERVAL BETWEEN PREGNANCIES AFFECTING PREGNANCY IN FIRST TRIMESTER, ANTEPARTUM: ICD-10-CM

## 2024-06-21 DIAGNOSIS — Z3A.08 8 WEEKS GESTATION OF PREGNANCY: ICD-10-CM

## 2024-06-21 DIAGNOSIS — Z34.81 PRENATAL CARE, SUBSEQUENT PREGNANCY IN FIRST TRIMESTER: Primary | ICD-10-CM

## 2024-06-21 PROCEDURE — 76830 TRANSVAGINAL US NON-OB: CPT | Performed by: OBSTETRICS & GYNECOLOGY

## 2024-06-21 NOTE — PROGRESS NOTES
Medical History:   Diagnosis Date    Elevated blood pressure reading        Current Outpatient Medications   Medication Sig    Prenatal Vit-Fe Fumarate-FA (PRENATAL PO) Take by mouth    lanolin (TONYA-O-SOOTHE) CREA cream Apply thin layer to nipples bid as needed (Patient not taking: Reported on 6/21/2024)    ibuprofen (ADVIL;MOTRIN) 800 MG tablet Take 1 tablet by mouth every 8 hours as needed for Pain (Patient not taking: Reported on 6/21/2024)     No current facility-administered medications for this visit.       Past Surgical:  has a past surgical history that includes Orange tooth extraction.    No Known Allergies      Current Outpatient Medications on File Prior to Visit   Medication Sig Dispense Refill    Prenatal Vit-Fe Fumarate-FA (PRENATAL PO) Take by mouth      lanolin (TONYA-O-SOOTHE) CREA cream Apply thin layer to nipples bid as needed (Patient not taking: Reported on 6/21/2024) 1 each 2    ibuprofen (ADVIL;MOTRIN) 800 MG tablet Take 1 tablet by mouth every 8 hours as needed for Pain (Patient not taking: Reported on 6/21/2024) 100 tablet 0     No current facility-administered medications on file prior to visit.       GYN hx:  Last Pap: 2/2023-Neg cotesting (was 1st pap smear per patient)  Hx Abnl Pap: Denies      Hx STDs: Denies      Nichelle  reports that she has never smoked. She has never used smokeless tobacco. She reports that she does not drink alcohol and does not use drugs.      /62   Ht 1.702 m (5' 7\")   Wt 53.1 kg (117 lb)   LMP 04/20/2024 (Approximate)   BMI 18.32 kg/m²     Physical Exam:  Constitutional: She appears well-developed and well-nourished. No distress.   HENT:    Head: Normocephalic and atraumatic.   Cardiovascular: Regular pulse   Pulmonary/Chest: Effort normal  Skin: She is not diaphoretic.   Psychiatric: She has a normal mood and affect. Her behavior is normal. Thought content normal.          Pelvic US today:  LMP 4/20/2024, EDC 1/25/2025, GA 8w6d; US today GA 8w0d, EDC

## 2024-06-21 NOTE — PATIENT INSTRUCTIONS
PTL/labor precautions, FMC, and pregnancy warning signs reviewed. Pt advised to call the office at [phone] or go straight to Labor and Delivery at [facility] with any of the following concerns vaginal bleeding, leaking of fluid, aba regularly Q 5-7 minutes for over an hour.

## 2024-06-22 PROBLEM — O09.899 SHORT INTERVAL BETWEEN PREGNANCIES AFFECTING PREGNANCY, ANTEPARTUM: Status: ACTIVE | Noted: 2024-06-22

## 2024-06-22 PROBLEM — O09.891 SHORT INTERVAL BETWEEN PREGNANCIES AFFECTING PREGNANCY IN FIRST TRIMESTER, ANTEPARTUM: Status: ACTIVE | Noted: 2024-06-22

## 2024-09-04 ENCOUNTER — TELEPHONE (OUTPATIENT)
Dept: OBGYN CLINIC | Age: 34
End: 2024-09-04

## 2024-09-04 NOTE — TELEPHONE ENCOUNTER
Received a phone call from patient's  on OB line.  states that patient had an IAB 2 months ago however the patient has been bleeding continuously since then. States that she has not been to either the ER or the clinic for follow up since the procedure was done.   States that it is not enough to soak a pad in an hour, is the amount of a period. Discussed bleeding precautions. Scheduled for ultrasound for follow up. Patient was seen in our office on 6/21/24 for a pregnancy confirmation ultrasound with Felicity Garcia NP.

## 2024-09-05 ENCOUNTER — PROCEDURE VISIT (OUTPATIENT)
Dept: OBGYN CLINIC | Age: 34
End: 2024-09-05

## 2024-09-05 ENCOUNTER — OFFICE VISIT (OUTPATIENT)
Dept: OBGYN CLINIC | Age: 34
End: 2024-09-05
Payer: COMMERCIAL

## 2024-09-05 VITALS
SYSTOLIC BLOOD PRESSURE: 118 MMHG | HEIGHT: 67 IN | WEIGHT: 118 LBS | DIASTOLIC BLOOD PRESSURE: 70 MMHG | BODY MASS INDEX: 18.52 KG/M2

## 2024-09-05 DIAGNOSIS — O03.4 RETAINED PRODUCTS OF CONCEPTION FOLLOWING ABORTION: Primary | ICD-10-CM

## 2024-09-05 PROCEDURE — 99214 OFFICE O/P EST MOD 30 MIN: CPT | Performed by: OBSTETRICS & GYNECOLOGY

## 2024-09-05 RX ORDER — IBUPROFEN 800 MG/1
800 TABLET, FILM COATED ORAL EVERY 6 HOURS PRN
Qty: 60 TABLET | Refills: 1 | Status: SHIPPED | OUTPATIENT
Start: 2024-09-05 | End: 2024-09-13 | Stop reason: SDUPTHER

## 2024-09-05 RX ORDER — MISOPROSTOL 100 UG/1
600 TABLET ORAL EVERY 6 HOURS
Qty: 48 TABLET | Refills: 0 | Status: SHIPPED | OUTPATIENT
Start: 2024-09-05 | End: 2024-09-13 | Stop reason: SDUPTHER

## 2024-09-13 ENCOUNTER — PROCEDURE VISIT (OUTPATIENT)
Dept: OBGYN CLINIC | Age: 34
End: 2024-09-13

## 2024-09-13 ENCOUNTER — OFFICE VISIT (OUTPATIENT)
Dept: OBGYN CLINIC | Age: 34
End: 2024-09-13
Payer: COMMERCIAL

## 2024-09-13 VITALS
BODY MASS INDEX: 18.52 KG/M2 | WEIGHT: 118 LBS | DIASTOLIC BLOOD PRESSURE: 80 MMHG | HEIGHT: 67 IN | SYSTOLIC BLOOD PRESSURE: 110 MMHG

## 2024-09-13 PROCEDURE — 99213 OFFICE O/P EST LOW 20 MIN: CPT | Performed by: OBSTETRICS & GYNECOLOGY

## 2024-09-13 RX ORDER — MISOPROSTOL 100 UG/1
600 TABLET ORAL EVERY 6 HOURS
Qty: 48 TABLET | Refills: 0 | Status: SHIPPED | OUTPATIENT
Start: 2024-09-13 | End: 2024-09-13 | Stop reason: SDUPTHER

## 2024-09-13 RX ORDER — IBUPROFEN 800 MG/1
800 TABLET, FILM COATED ORAL EVERY 6 HOURS PRN
Qty: 60 TABLET | Refills: 1 | Status: SHIPPED | OUTPATIENT
Start: 2024-09-13

## 2024-09-13 RX ORDER — MISOPROSTOL 100 UG/1
600 TABLET ORAL EVERY 6 HOURS
Qty: 48 TABLET | Refills: 0 | Status: SHIPPED | OUTPATIENT
Start: 2024-09-13

## 2024-10-14 ENCOUNTER — OFFICE VISIT (OUTPATIENT)
Dept: OBGYN CLINIC | Age: 34
End: 2024-10-14
Payer: COMMERCIAL

## 2024-10-14 ENCOUNTER — PROCEDURE VISIT (OUTPATIENT)
Dept: OBGYN CLINIC | Age: 34
End: 2024-10-14
Payer: COMMERCIAL

## 2024-10-14 VITALS
HEIGHT: 67 IN | WEIGHT: 120 LBS | SYSTOLIC BLOOD PRESSURE: 120 MMHG | DIASTOLIC BLOOD PRESSURE: 60 MMHG | BODY MASS INDEX: 18.83 KG/M2

## 2024-10-14 DIAGNOSIS — Z87.59 HISTORY OF MISCARRIAGE: Primary | ICD-10-CM

## 2024-10-14 PROCEDURE — 76830 TRANSVAGINAL US NON-OB: CPT | Performed by: OBSTETRICS & GYNECOLOGY

## 2024-10-14 PROCEDURE — 99213 OFFICE O/P EST LOW 20 MIN: CPT | Performed by: OBSTETRICS & GYNECOLOGY

## 2024-10-14 RX ORDER — TIMOLOL MALEATE 5 MG/ML
1 SOLUTION/ DROPS OPHTHALMIC DAILY
COMMUNITY
Start: 2024-09-23 | End: 2024-10-14 | Stop reason: ALTCHOICE

## 2024-10-14 NOTE — PROGRESS NOTES
End of Shift Note: Medical    What matters most to the patient this shift: Figuring out why dizziness with headache is occurring.     Significant Events: Patient states symptoms are improved today but still intermittently occurring. Patient very forgetful, alarm placed for safety.     Pain Management: Last Pain Score: Numeric Rating Scale 0-10: 1 (09/07/21 1455)                                      Pain medication:  Tylenol q4hrs last given at 1455  Diet: Regular Diet      Bowel Function: continent   LBM:    PTA  Activity:  Activity: Resting in bed (09/07/21 1330)  Mobility Assistive Device:  Mobility Assistive Device: Friction reducing device/Slide sheet (09/07/21 0536)  Level of Assistance:  Level of Assistance: Supervision (09/07/21 1330)  Positioning: Positioning: Semi-fowlers (09/07/21 0638)  LDAs: Right AC   Patient's Anticipated Discharge Needs: Anticipated discharge needs eval completed (09/07/21 1200)  Expected Discharge Date: TBD  Expected Discharge Time: TBD       Patient presents today for   Chief Complaint   Patient presents with    Ultrasound     Retained POC    Follow-up       LMP: Patient's last menstrual period was 10/07/2024.  Contraception: none      No Known Allergies  Current Outpatient Medications   Medication Sig Dispense Refill    ibuprofen (ADVIL;MOTRIN) 800 MG tablet Take 1 tablet by mouth every 6 hours as needed for Pain (Patient not taking: Reported on 10/14/2024) 60 tablet 1    miSOPROStol (CYTOTEC) 100 MCG tablet Place 6 tablets vaginally every 6 hours Retained products of conception, no fetus present on ultrasound. (Patient not taking: Reported on 10/14/2024) 48 tablet 0    Prenatal Vit-Fe Fumarate-FA (PRENATAL PO) Take by mouth (Patient not taking: Reported on 10/14/2024)       No current facility-administered medications for this visit.     Past Medical History:   Diagnosis Date    Elevated blood pressure reading     Elevated BP readings during hospital stay w/G1 however, no diagnosis of HTN     Past Surgical History:   Procedure Laterality Date    WISDOM TOOTH EXTRACTION       Social History     Socioeconomic History    Marital status:      Spouse name: Not on file    Number of children: Not on file    Years of education: Not on file    Highest education level: Not on file   Occupational History    Not on file   Tobacco Use    Smoking status: Never    Smokeless tobacco: Never   Vaping Use    Vaping status: Never Used   Substance and Sexual Activity    Alcohol use: Never    Drug use: Never    Sexual activity: Yes     Partners: Male   Other Topics Concern    Not on file   Social History Narrative    Not on file     Social Determinants of Health     Financial Resource Strain: Not on file   Food Insecurity: Not on file (4/26/2023)   Transportation Needs: Not on file   Physical Activity: Not on file   Stress: Not on file   Social Connections: Not on file   Intimate Partner Violence: Not on file   Housing Stability: Not on file     Family History